# Patient Record
Sex: FEMALE
[De-identification: names, ages, dates, MRNs, and addresses within clinical notes are randomized per-mention and may not be internally consistent; named-entity substitution may affect disease eponyms.]

---

## 2019-07-30 ENCOUNTER — HOSPITAL ENCOUNTER (EMERGENCY)
Dept: HOSPITAL 25 - ED | Age: 84
Discharge: HOME | End: 2019-07-30
Payer: MEDICARE

## 2019-07-30 VITALS — DIASTOLIC BLOOD PRESSURE: 74 MMHG | SYSTOLIC BLOOD PRESSURE: 121 MMHG

## 2019-07-30 DIAGNOSIS — M25.461: ICD-10-CM

## 2019-07-30 DIAGNOSIS — M25.561: Primary | ICD-10-CM
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
## 2019-07-30 DIAGNOSIS — M79.661: ICD-10-CM
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
## 2019-07-30 PROCEDURE — 99282 EMERGENCY DEPT VISIT SF MDM: CPT

## 2019-07-30 NOTE — ED
Lower Extremity





- HPI Summary


HPI Summary: 





This pt is an 85 y/[facility] s/p injury 

today. Pt reports she was standing next to her bed and went to move her bed 

with her right leg. She notes she heard a loud pop and subsequently fell. 

Denies head strike. Denies right ankle pain, pelvic pain, left leg pain. Pt 

states she has not been able to ambulate since then. Her pain is aggravated 

with movement and weight bearing, which is rated 8/10 in severity and described 

as sharp. Her pain is alleviated with rest.  


Denies any prior injuries or surgeries to her right leg/right knee. 


Hx vertigo, GERD, hypercholesterolemia. 





- History of Current Complaint


Stated Complaint: RT LEG PAIN PER PT


Time Seen by Provider: 07/30/19 11:10


Hx Obtained From: Patient


Mechanism Of Injury: Blunt Trauma, Fall From A Standing Position


Onset of Pain: Hours


Onset/Duration: Still Present


Severity Currently: Severe


Pain Intensity: 8 - with movement


Pain Scale Used: 0-10 Numeric


Timing: Lasting Hours


Location: Is Discrete @ - right knee


Associated Signs And Symptoms: Positive: Knee Pain - right


Aggravating Factor(s): Movement


Alleviating Factor(s): Rest


Able to Bear Weight: No





- Allergies/Home Medications


Allergies/Adverse Reactions: 


 Allergies











Allergy/AdvReac Type Severity Reaction Status Date / Time


 


No Known Allergies Allergy   Verified 08/10/13 08:31














PMH/Surg Hx/FS Hx/Imm Hx


Endocrine/Hematology History: 


   Denies: Hx Diabetes, Hx Thyroid Disease


Cardiovascular History: Reports: Hx Hypercholesterolemia


   Denies: Hx Hypertension, Hx Pacemaker/ICD


Respiratory History: 


   Denies: Hx Asthma, Hx Chronic Obstructive Pulmonary Disease (COPD)


GI History: Reports: Hx Gastroesophageal Reflux Disease


   Denies: Hx Ulcer


 History: 


   Denies: Hx Dialysis, Hx Renal Disease


Musculoskeletal History: Reports: Hx Arthritis - right hand


   Denies: Hx Osteoporosis


Sensory History: Reports: Hx Cataracts, Hx Contacts or Glasses, Hx Glaucoma


   Denies: Hx Hearing Aid


Opthamlomology History: Reports: Hx Cataracts, Hx Contacts or Glasses, Hx 

Glaucoma


Neurological History: Reports: Hx Headaches - daily, Hx Seizures, Other Neuro 

Impairments/Disorders - Vertigo


   Denies: Hx Transient Ischemic Attacks (TIA)


Psychiatric History: Reports: Hx Panic Disorder - A LITTLE BIT





- Cancer History


Cancer Type, Location and Year: breast


Hx Chemotherapy: No


Hx Radiation Therapy: No





- Surgical History


Surgical History: Yes


Surgery Procedure, Year, and Place: LT LUMPECTOMY 2006, RT HAND SURGERY,  

HYSTERECTOMY 1972, CATARACTS,


Infectious Disease History: 


   Denies: Hx Clostridium Difficile, Hx Hepatitis, Hx Human Immunodeficiency 

Virus (HIV), Hx of Known/Suspected MRSA





- Family History


Known Family History: 


   Negative: Cardiac Disease, Hypertension, Diabetes





- Social History


Alcohol Use: Daily


Alcohol Amount: "1 DRINK" TODAY


Substance Use Type: Reports: None


Smoking Status (MU): Unknown if Ever Smoked





Review of Systems


Negative: Fever, Chills


Musculoskeletal: Other - POSITIVE: right lower leg/knee pain


Negative: Other - NEGATIVE: right ankle pain, left leg pain, left knee pain


All Other Systems Reviewed And Are Negative: Yes





Physical Exam





- Summary


Physical Exam Summary: 





Constitutional: Well-developed, Well-nourished, Alert. (-) Distressed


Skin: Warm, Dry


HENT: Normocephalic; Atraumatic


Eyes: Conjunctiva normal


Neck: Musculoskeletal ROM normal neck.


Cardio: Rhythm regular, rate normal, Heart sounds normal; Intact distal pulses; 

Radial pulses are 2+ and symmetric. (-) Murmur


Pulmonary/Chest wall: Effort normal. (-) Respiratory distress,


Abd: Soft, (-) tenderness, (-) Distension, (-) Guarding, (-) Rebound


Musculoskeletal: 


Pelvis: no soft tissue swelling or discoloration, no palpable widening of 

symphysis, stable to rocking


RLE: Mild knee swelling, no deformity effusions,, no crepitus palpated, 

compartments soft and compressible


SILT


2+ DP, brisk cap refill x 5





Hip: no ttp, no pain with log roll


Knee: lateral ttp, able to SLR, Pain w flextion, no lig laxity


Ankle: no ttp, FROM without pain, no instability


Toes: no ttp, FROM without pain


LLE: atraumatic





Lymph: (-) Cervical adenopathy


Neuro: Alert, Oriented x3


Psych: Mood and affect Normal


Triage Information Reviewed: Yes


Vital Signs On Initial Exam: 





 Initial Vitals











Temp Pulse Resp BP Pulse Ox


 


 97.7 F   72   18   144/96   96 


 


 07/30/19 11:10  07/30/19 11:10  07/30/19 11:10  07/30/19 11:10  07/30/19 11:10








Vital Signs Reviewed: Yes





Diagnostics





- Laboratory


Lab Statement: Any lab studies that have been ordered have been reviewed, and 

results considered in the medical decision making process.





- Radiology


  ** Right knee XR


Radiology Interpretation Completed By: Radiologist


Summary of Radiographic Findings: IMPRESSION: Small joint effusion, no fracture 

is seen. Dr. Ridley has reviewed this report.





  ** Right tibia fibula XR


Radiology Interpretation Completed By: Radiologist


Summary of Radiographic Findings: IMPRESSION: Osteopenia. No acute osseous 

injury. If symptoms persist, recommend repeat imaging. Dr. Ridley has 

reviewed this report.





Re-Evaluation





- Re-Evaluation


  ** First Eval


Re-Evaluation Time: 12:33


Comment: XR with no fractures, discussed with pt. Plan for knee immobilizer. 

She does not want to stay. She states she does not have stairs at home.  Plan 

for knee immobilizer and follow up with orthopedist.





  ** Second Eval


Re-Evaluation Time: 13:05


Comment: She ambulated with the walker. Plan to discharge patient now. She will 

take her medications at home.





Lower Extremity Course/Dx





- Course


Course Of Treatment: 86-year-old female who presents to the right knee and shin 

pain after mechanical fall.  - Tenderness just below the right knee, able to 

straight leg raise.  No obvious deformities check plain films and reassess.  - 

possible ligamentous injury although no obvious laxity on exam. Do not suspect 

dislocation/relocation





- Diagnoses


Provider Diagnoses: 


 Fall from ground level, Right leg pain








Discharge





- Sign-Out/Discharge


Documenting (check all that apply): Patient Departure - Discharge home


Patient Received Moderate/Deep Sedation with Procedure: No





- Discharge Plan


Condition: Stable


Disposition: HOME


Patient Education Materials:  Leg Pain (ED)


Referrals: 


Cecilia Lopez MD [Primary Care Provider] - 


Xi Hale MD [Medical Doctor] - 


Additional Instructions: 


You were seen in the emergency department for leg pain.  Your x-ray did not 

show any fractures however we cannot rule out injuries to the ligaments and 

tendons in your knee chest x-ray.  Please use a knee immobilizer.  Please 

follow up with orthopedics in the next week.


If any studies were not completed at the time of discharge you will be called 

with the relevant results.


Please follow up with your primary care doctor in next 2-3 days and return to 

emergency department for worsening pain, numbness or tingling of her leg, or 

concerning symptoms. It was a pleasure taking care of you today.





- Billing Disposition and Condition


Condition: STABLE


Disposition: Home





- Attestation Statements


Document Initiated by Zara: Yes


Documenting Scribe: Carmen Guerra


Provider For Whom Zara is Documenting (Include Credential): Viv Ridley MD


Scribe Attestation: 


I, Carmen Guerra, scribed for Viv Ridley MD on 07/30/19 at 1719. 


Scribe Documentation Reviewed: Yes


Provider Attestation: 


The documentation as recorded by the [email or web address] accurately reflects 

the service I personally performed and the decisions made by me, Viv Ridley MD


Status of Scribe Document: Viewed

## 2019-12-28 ENCOUNTER — HOSPITAL ENCOUNTER (EMERGENCY)
Dept: HOSPITAL 25 - ED | Age: 84
Discharge: HOME | End: 2019-12-28
Payer: MEDICARE

## 2019-12-28 VITALS — SYSTOLIC BLOOD PRESSURE: 174 MMHG | DIASTOLIC BLOOD PRESSURE: 68 MMHG

## 2019-12-28 DIAGNOSIS — M25.571: ICD-10-CM

## 2019-12-28 DIAGNOSIS — M51.37: ICD-10-CM

## 2019-12-28 DIAGNOSIS — M25.551: Primary | ICD-10-CM

## 2019-12-28 PROCEDURE — 72110 X-RAY EXAM L-2 SPINE 4/>VWS: CPT

## 2019-12-28 PROCEDURE — 99282 EMERGENCY DEPT VISIT SF MDM: CPT

## 2019-12-28 NOTE — XMS REPORT
Summary of Care

 Created on:2019



Patient:Loly Bob

Sex:Female

:1933

External Reference #:[id number]





Demographics







 Address  [address] [place] [address]

 

 Home Phone  [phone]

 

 Work Phone  [phone]

 

 Mobile Phone  [phone]

 

 Preferred Language  English

 

 Marital Status  Not  or 

 

 [employer] Affiliation  Unknown

 

 Race  White

 

 Ethnic Group  Not  or 









Author







 Organization  [facility]

 

 Address  [address] [address]









Support







 Name  Relationship  Address  Phone

 

 Grecia Morelos  Unavailable  [address]  [phone]



     [place] [address]  

 

 Grecia Morelos  Unavailable  [address]  [phone]



     [place] [address]  









Care Team Providers







 Name  Role  Phone

 

 Cecilia Lopez  Primary Care Provider  [phone]









Reason for Visit







 Reason  Comments

 

 Follow Up  5 week follow up on antidepressants - increase in dosage







Encounter Details







 Date  Type  Department  Care Team  Description

 

 2019  Office Visit  [facility]  Cecilia Lopez MD



  Depression,



     Medicine



  [address]



  unspecified depression



     [address]



  [place] [address]



  type (Primary Dx)



     [place] [address]



  [phone] (Fax)  







Allergies







 Active Allergy  Reactions  Severity  Noted Date  Comments

 

 Amitriptyline  Other    10/21/2014  Did not work for sleep or chronic



         pain

 

 Fenofibrate  Musculoskeletal    2013  Severe muscle cramps

 

 Alc-Gabapentin  CNS Reaction    2014  Dizzy



documented as of this encounter (statuses as of 2019)



Medications







 Medication  Sig  Dispensed  Refills  Start Date  End Date  Status

 

 Naproxen Sodium (ALEVE)  Take 1 Tab by    0      Active



 220 MG Oral Cap  mouth EVERY          



   BEDTIME.          

 

 brimonidine (ALPHAGAN  Place 1 Drop in    0  2016    Active



 P) 0.2 % Ophthalmic  both eyes TWICE          



 Solution  DAILY.          

 

 dorzolamide-timolol  Place 1 Drop in    0  2016    Active



 (COSOPT) 22.3-6.8 MG/ML  both eyes TWICE          



 Ophthalmic Solution  DAILY.          

 

 mupirocin (BACTROBAN) 2  Face-  Apply  30 g  3  2016    Active



 % Apply externally  twice daily          



 Cream            

 

 Omeprazole 40 MG Oral  Take 1 Cap by  90 Cap  3  2019    Active



 CAPSULE DELAYED RELEASE  mouth DAILY.          

 

 simvastatin (ZOCOR) 40  Take 1 Tab by  90 Tab  3  2019    Active



 MG Oral TabIndications:  mouth DAILY.          



 Lipid disorder            

 

 sertraline (ZOLOFT) 50  Take 1 Tab by  60 Tab  1  10/17/2019    Active



 MG Oral TabIndications:  mouth DAILY.          



 Depression, unspecified            



 depression type            









   



   Additional information



   Patient taking differently: 100 mg Oral DAILY, Reported on 2019  1:02 
PM









 sertraline (ZOLOFT) 100 MG  Take 1 Tab by mouth DAILY.  90 Tab  1  2019 
   Active



 Oral Tab            



documented as of this encounter (statuses as of 2019)



Active Problems







 Problem  Noted Date

 

 Cervical pain (neck)  2015

 

 Cervical spondylosis without myelopathy  2015

 

 Vertigo  2014









 Overview: 







 Has seen ear,  nose,  and throat / neurology in the past









 BMI 32.0-32.9,adult  2014









 Overview: 







 This patient's BMI has been calculated and is in the acceptable range   For 
this



 patient and age









 Chronic obstructive pulmonary disease  2009









 Overview: 







 Replaced inactive diagnosis









 Other and unspecified hyperlipidemia  2008

 

 Personal history of tobacco use, presenting hazards to health  10/29/2007

 

 Carcinoma in situ of breast  2006



documented as of this encounter (statuses as of 2019)



Immunizations







 Name  Administration Dates  Next Due

 

 Influenza Vaccine 65 Yrs +  2019  

 

 Influenza Vaccine High Dose  2017, 2016, 10/10/2015,  



   10/07/2014  

 

 Influenza Vaccine Whole  2008, 10/27/2007  

 

 PNEUMOCOCCAL POLYSACCHARIDE VACCINE  10/24/2006  

 

 Pneumococcal Conjugate Vaccine  2017  



documented as of this encounter



Social History







 Tobacco Use  Types  Packs/Day  Years Used  Date

 

 Former Smoker        Quit: 2000









 Smokeless Tobacco: Never Used      









 Comments: Quit 2000 after smoking 50 years.









 Alcohol Use  Drinks/Week  oz/Week  Comments

 

 Yes  1 Shots of liquor  1.0  









 Sex Assigned at Birth  Date Recorded

 

 Not on file  









 Job Start Date  Occupation  Industry

 

 Not on file  Not on file  Not on file









 Travel History  Travel Start  Travel End









 No recent travel history available.



documented as of this encounter



Last Filed Vital Signs







 Vital Sign  Reading  Time Taken  Comments

 

 Blood Pressure  124/58  2019 12:58 PM  



     EST  

 

 Pulse  75  2019 12:58 PM  



     EST  

 

 Temperature  -  -  

 

 Respiratory Rate  -  -  

 

 Oxygen Saturation  97%  2019 12:58 PM  



     EST  

 

 Inhaled Oxygen Concentration  -  -  

 

 Weight  67.5 kg (148 lb 14.4 oz)  2019 12:58 PM  



     EST  

 

 Height  154.9 cm (5' 1")  2019 12:58 PM  



     EST  

 

 Body Mass Index  28.13  2019 12:58 PM  



     EST  



documented in this encounter



Patient Instructions

Patient InstructionsCecilia Lopez MD - 2019  1:00 PM ESTContinue 
increased dose of sertraline ( 100mg )  Until we meetElectronically signed by 
Cecilia Lopez MD at 2019  1:33 PM EST

documented in this encounter



Progress Notes

Cecilia Lopez MD - 2019  1:00 PM ESTFormatting of this note might be 
different from the original.

NAME:Loly Bob

MRN: [id number]

1933: 1933

ENC Date: 2019



CC:

Chief Complaint

Patient presents with

 Follow Up

  5 week follow up on antidepressants - increase in dosage



Loly Bob is a 86-y.o. female

Follow up for depression -  Started on sertraline 50 mg last visit and now on 
100 mg x 1 week

10/17/19 - also counseled re non medicinal approaches

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



Current Outpatient Medications

Medication Sig

 brimonidine (ALPHAGAN P) 0.2 % Ophthalmic Solution Place 1 Drop in both 
eyes TWICE DAILY.

 dorzolamide-timolol (COSOPT) 22.3-6.8 MG/ML Ophthalmic Solution Place 1 
Drop in both eyes TWICE DAILY.

 mupirocin (BACTROBAN) 2 % Apply externally Cream Face-  Apply twice daily

 Naproxen Sodium (ALEVE) 220 MG Oral Cap Take 1 Tab by mouth EVERY 
BEDTIME.

 Omeprazole 40 MG Oral CAPSULE DELAYED RELEASE Take 1 Cap by mouth DAILY.

 sertraline (ZOLOFT) 100 MG Oral Tab Take 1 Tab by mouth DAILY.

 sertraline (ZOLOFT) 50 MG Oral Tab Take 1 Tab by mouth DAILY. (Patient 
taking differently: Take 100 mg by mouth DAILY.)

 simvastatin (ZOCOR) 40 MG Oral Tab Take 1 Tab by mouth DAILY.



No current facility-administered medications for this visit.



Patient Active Problem List

 Diagnosis Date Noted

 Vertigo 2014

  Priority: High

  Has seen ear,  nose,  and throat / neurology in the past



 Cervical pain (neck) 2015

 Cervical spondylosis without myelopathy 2015

 BMI 32.0-32.9,adult 2014

  This patient's BMI has been calculated and is in the acceptable range   For 
this patient and age



 Chronic obstructive pulmonary disease (HCC) 2009

  Replaced inactive diagnosis



 Other and unspecified hyperlipidemia 2008

 Personal history of tobacco use, presenting hazards to health 10/29/2007

 Carcinoma in situ of breast 2006



Family History

Problem Relation Age of Onset

 Arthritis Mother

 Heart Father

 No Known Problems Sister

 No Known Problems Daughter

 No Known Problems Son



No cardiopulmonary symptoms   No upper or lower GI complaints    No urinary 
tract symptoms.  No bruising/ bleeding. No neurological complaints .  No 
insomnia.+ .

Social History



Tobacco Use

 Smoking status: Former Smoker

  Last attempt to quit: 3/1/2000

  Years since quittin.7

 Smokeless tobacco: Never Used

 Tobacco comment: Quit 2000 after smoking 50 years.

Substance Use Topics

 Alcohol use: Yes

  Alcohol/week: 1.0 standard drinks

  Types: 1 Shots of liquor per week

 Drug use: No



OBJECTIVE:

/58  | Pulse 75  | Ht 5' 1" (1.549 m)  | Wt 148 lb 14.4 oz (67.5 kg)  | 
SpO2 97%  | BMI 28.13 kg/m .



A/P

  ICD-9-CM ICD-10-CM

1. Depression, unspecified depression type 311 F32.9



Patient Instructions

Continue increased dose of sertraline ( 100mg )  Until we meet



AUTHOR: Cecilia Lopez MD 13:33 2019Electronically signed by Cecilia Lopez MD at 2019  1:33 PM ESTdocumented in this encounter



Plan of Treatment







 Health Maintenance  Due Date  Last Done  Comments

 

 MEDICARE ANNUAL WELLNESS  2018, 04/15/2016  



 VISIT      

 

 DEPRESSION SCREENING  10/17/2020  10/17/2019, 10/17/2019  

 

 FALL RISK ASSESSMENT  2020, 2019  

 

 HIV SCREENING  2020    Postponed from



       1948 (Patient



       refused)

 

 ZOSTER IMMUNIZATION SERIES  2020    Postponed from



 (1 of 2)      1983 (Vaccine not



       available)

 

 LIPID DISORDER SCREENING  2024, 2012,  



     2010, Additional  



     history exists  

 

 PNEUMOCOCCAL 65+YRS  Completed  2017, 10/24/2006  

 

 INFLUENZA VACCINE  Completed  2019, 2017,  



     2016, Additional  



     history exists  

 

 HPV IMMUNIZATION SERIES  Aged Out    No longer eligible



       based on patient's age



       to complete this topic

 

 MENINGOCOCCAL VACCINE IMM  Aged Out    No longer eligible



       based on patient's age



       to complete this topic



documented as of this encounter



Goals







 Goal  Patient Goal  Associated  Recent  Patient-Stated?  Author



   Type  Problems  Progress    

 

 Depression  Depression    21  No  Jessica



 screen (PHQ-9)      (10/17/2019    MD Cecilia



 total score < 5      9:00 AM EDT)    









 Note: 



This is an individualized treatment (depression) goal for Lolyalessandra Bob:



 Displayed above is your goal for a depression screening (PHQ-9) score that 
would indicate good control of your depression.









 Keep immunizations current  Lifestyle      No  Cecilia Lopez MD









 Note: 



This is an individualized lifestyle goal for Lolymansi Bob:



 Please be sure to keep up-to-date on recommended immunizations.  For example, 
this would include a yearly influenza vaccine.



 Immunization status can be seen by looking at the Health Maintenance sections 
of your [facility], Plan of Care, and any After Visit Summaries.









 Keep a regular sleep schedule  Lifestyle      No  Cecilia Lopez MD









 Note: 



This is an individualized lifestyle goal for Lolymansi Bob:



 Please maintain a regular sleep schedule.  This may help with some symptoms of 
depression.









 Take all prescribed medications as directed  Self-management      No  Cecilia Lopez MD









 Note: 



This is an individualized self-management goal for Loly Bob:



 Please take all prescribed medications as directed.



 1.  Do not skip doses.  If you cannot afford your medications, talk with your 
doctor.



 2.  Use a pill reminder system such as a pill box if needed.  Your pharmacist 
can help you with this.



 3.  Contact your Pharmacy 5 days before your medication runs out.  If you 
cannot take your medications for any reasons, talk with your doctor.



 4.  Please bring all of your medication bottles and inhalers (or a list of all 
your medications/inhalers) with you to every visit.



 Potential barriers to meeting all of your care plan goals will continue to be 
addressed on an ongoing basis.



documented as of this encounter



Results

Not on filedocumented in this encounter



Visit Diagnoses







 Diagnosis

 

 Depression, unspecified depression type - Primary



documented in this encounter



Insurance







 Payer  Benefit Plan / Group  Subscriber ID  Effective Dates  Phone  Address  
Type

 

 MEDICARE  MEDICARE PART A & B  xxxxxxxxxxx  1993-Present      Medicare [employer] EMPIRE  [facility]-EMPIRE PLAN  xxxxxxxxx  2017-Present      [place]









 Guarantor Name  Account Type  Relation to  Date of  Phone  Billing Address



     Patient  Birth    

 

 Loly Bob  Personal/Famil    1933  [phone]  [address]      (Home)



  [place]







         [phone]  [place] [address]



         (Work)  



documented as of this encounter



Advance Directives







 Type  Date Recorded  Patient Representative  Explanation

 

 Advance Directives  9/15/2017 10:11 AM    [place] Living Will / Health



       Care Proxy

## 2019-12-28 NOTE — ED
Lower Extremity





- HPI Summary


HPI Summary: 





Patient is an 87 y/o F presenting to the ED via EMS for a chief complaint of 

right LE and right gluteal pain that began after a fall 2 weeks ago. Per EMS, 

patient is arriving from her house and does not take blood thinners. Patient 

reports seeing a chiropractor for her pain without relief. She states the right 

gluteal pain radiates to the right leg, right calf, and right toes. Patient 

denies right knee pain, back pain, or fever. She describes the pain as a 

constant throbbing sensation. She reports being unable to walk very well at 

home due to the pain she is experiencing. Patient denies any alleviating 

factors. Any significant PMHx or FMHx is denied. 





- History of Current Complaint


Stated Complaint: R LEG PAIN PER EMS


Hx Obtained From: Patient, EMS


Mechanism Of Injury: Fall From A Standing Position


Onset of Pain: Immediate


Onset/Duration: Weeks - 2 weeks


Severity Initially: Moderate


Severity Currently: Moderate


Pain Scale Used: 0-10 Numeric


Timing: Constant


Location: Is Discrete @ - Right LE and right glutteal region


Character Of Pain: Throbbing


Associated Signs And Symptoms: Negative: Knee Pain


Aggravating Factor(s): Ambulation


Alleviating Factor(s): Nothing





- Allergies/Home Medications


Allergies/Adverse Reactions: 


 Allergies











Allergy/AdvReac Type Severity Reaction Status Date / Time


 


No Known Allergies Allergy   Verified 08/05/19 12:34














PMH/Surg Hx/FS Hx/Imm Hx


Previously Healthy: Yes


Endocrine/Hematology History: 


   Denies: Hx Diabetes, Hx Thyroid Disease


Cardiovascular History: Reports: Hx Hypercholesterolemia


   Denies: Hx Hypertension, Hx Pacemaker/ICD


Respiratory History: 


   Denies: Hx Asthma, Hx Chronic Obstructive Pulmonary Disease (COPD)


GI History: Reports: Hx Gastroesophageal Reflux Disease


   Denies: Hx Ulcer


 History: 


   Denies: Hx Dialysis, Hx Renal Disease


Musculoskeletal History: Reports: Hx Arthritis - right hand


   Denies: Hx Osteoporosis


Sensory History: Reports: Hx Cataracts, Hx Contacts or Glasses, Hx Glaucoma


   Denies: Hx Legally Blind, Hx Deafness, Hx Hearing Aid


Opthamlomology History: Reports: Hx Cataracts, Hx Contacts or Glasses, Hx 

Glaucoma


   Denies: Hx Legally Blind


EENT History: 


   Denies: Hx Deafness


Neurological History: Reports: Hx Headaches - daily, Hx Seizures, Other Neuro 

Impairments/Disorders - Vertigo


   Denies: Hx Transient Ischemic Attacks (TIA)


Psychiatric History: 


   Denies: Hx Panic Disorder





- Cancer History


Cancer Type, Location and Year: breast


Hx Chemotherapy: No


Hx Radiation Therapy: No





- Surgical History


Surgical History: Yes


Surgery Procedure, Year, and Place: LT LUMPECTOMY 2006, RT HAND SURGERY,  

HYSTERECTOMY 1972, CATARACTS,


Infectious Disease History: No


Infectious Disease History: 


   Denies: Hx Clostridium Difficile, Hx Hepatitis, Hx Human Immunodeficiency 

Virus (HIV), Hx of Known/Suspected MRSA





- Family History


Known Family History: 


   Negative: Cardiac Disease, Hypertension, Diabetes





- Social History


Occupation: Retired


Lives: Alone


Alcohol Use: Daily


Alcohol Amount: "1 DRINK" TODAY


Substance Use Type: Reports: None


Hx Tobacco Use: No


Smoking Status (MU): Never Smoked Tobacco





Review of Systems


Negative: Fever


Positive: Myalgia - Right leg, right calf, right toes, and right gluteal 

region.  Negative: Arthralgia - Right knee or back


All Other Systems Reviewed And Are Negative: Yes





Physical Exam





- Summary


Physical Exam Summary: 





Constitutional: Well-developed, Well-nourished, Alert. (-) Distressed


Skin: Warm, Dry


HENT: Normocephalic; Atraumatic


Eyes: Conjunctiva normal


Neck: Musculoskeletal ROM normal neck. (-) JVD, (-) Stridor, (-) Nuchal rigidity


Cardio: Rhythm regular, rate normal, Heart sounds normal; Intact distal pulses; 

Radial pulses are 2+ and symmetric. (-) Murmur


Pulmonary/Chest wall: Effort normal. (-) Respiratory distress, (-) Wheezes, (-) 

Rales


Abd: Soft, (-) tenderness, (-) Distension, (-) Guarding, (-) Rebound


Musculoskeletal: (-) Edema. Tenderness of the right hip, buttocks, tib/fib, and 

foot, pain with decreased ROM of the right hip, paraspinal lumbar tenderness.


Lymph: (-) Cervical adenopathy


Neuro: Alert, Oriented x3


Psych: Mood and affect Normal


Triage Information Reviewed: Yes


Vital Signs Reviewed: Yes





Procedures





- Sedation


Patient Received Moderate/Deep Sedation with Procedure: No





Diagnostics





- Laboratory


Lab Statement: Any lab studies that have been ordered have been reviewed, and 

results considered in the medical decision making process.





- Radiology


  ** Lumbar Spine X-ray


Radiology Interpretation Completed By: Radiologist


Summary of Radiographic Findings: Lumbar Spine X-ray IMPRESSION:  1. NO 

EVIDENCE FOR FRACTURE.  2. MODERATE DEGENERATIVE DISC DISEASE.  Reviewed by Dr. Ridley.





  ** Lower Extremity X-ray


Radiology Interpretation Completed By: Radiologist


Summary of Radiographic Findings: Lower Extremity X-ray IMPRESSION: NO EVIDENCE 

OF FRACTURE.  Reviewed by Dr. Ridley.





  ** Hip/Pelvis X-ray


Radiology Interpretation Completed By: Radiologist


Summary of Radiographic Findings: Hip/Pelvis X-ray IMPRESSION: NO EVIDENCE FOR 

FRACTURE, IF THE PATIENT'S SYMPTOMS PERSIST RECOMMEND FOLLOW-UP IMAGING.  

Reviewed by Dr. Ridley.





  ** Ankle X-ray


Radiology Interpretation Completed By: Radiologist


Summary of Radiographic Findings: Ankle X-ray IMPRESSION: NO EVIDENCE FOR 

FRACTURE.  Reviewed by Dr. Ridley.





Re-Evaluation





- Re-Evaluation


  ** First Eval


Re-Evaluation Time: 09:49


Change: Improved


Comment: At 09:49, patient is able to ambulate with a walker.





Lower Extremity Course/Dx





- Course


Course Of Treatment: 87 y/o F p/w RLE pain in setting of fall 1 week ago.  - 

reporting buttock pain radiating down leg, also w tibfib ttp. Plain films 

without fracture. Has been ambulating at home w walker. Suspect piriformis pain

, low suspicion occult fracture. Will try motrin/tylenol and reassess





- Diagnoses


Provider Diagnoses: 


 Right hip pain








Discharge ED





- Sign-Out/Discharge


Documenting (check all that apply): Patient Departure - Discharge





- Discharge Plan


Condition: Stable


Disposition: HOME


Patient Education Materials:  Hip Pain (ED)


Referrals: 


Cecilia Lopez MD [Primary Care Provider] - 


Additional Instructions: 


You were seen in the emergency department for right leg pain.  Your x-rays did 

not show any fractures. You can take tylenol for the pain.


Please follow up with your primary care doctor in next 2-3 days and return to 

emergency department for inability to walk, numbness or tingling or weakness of 

the leg, worsening or concerning symptoms.


It was a pleasure taking care of you today.





- Billing Disposition and Condition


Condition: STABLE


Disposition: Home





- Attestation Statements


Document Initiated by Scribe: Yes


Documenting Scribe: Josie Hernandez


Provider For Whom Zara is Documenting (Include Credential): Viv Ridley MD


Scribe Attestation: 


I, Josie Hernandez, scribed for Viv Ridley MD on 12/28/19 at 0957. 


Scribe Documentation Reviewed: Yes


Provider Attestation: 


The documentation as recorded by the scribe, Josie Hernandez accurately reflects 

the service I personally performed and the decisions made by me, Viv Ridley MD


Status of Scribe Document: Viewed

## 2019-12-30 ENCOUNTER — HOSPITAL ENCOUNTER (INPATIENT)
Dept: HOSPITAL 25 - ED | Age: 84
LOS: 5 days | Discharge: HOSPICE | DRG: 917 | End: 2020-01-04
Attending: INTERNAL MEDICINE | Admitting: INTERNAL MEDICINE
Payer: MEDICARE

## 2019-12-30 DIAGNOSIS — E83.39: ICD-10-CM

## 2019-12-30 DIAGNOSIS — C78.1: ICD-10-CM

## 2019-12-30 DIAGNOSIS — E87.6: ICD-10-CM

## 2019-12-30 DIAGNOSIS — Z82.49: ICD-10-CM

## 2019-12-30 DIAGNOSIS — E87.4: ICD-10-CM

## 2019-12-30 DIAGNOSIS — D72.829: ICD-10-CM

## 2019-12-30 DIAGNOSIS — M51.36: ICD-10-CM

## 2019-12-30 DIAGNOSIS — Z85.3: ICD-10-CM

## 2019-12-30 DIAGNOSIS — Z66: ICD-10-CM

## 2019-12-30 DIAGNOSIS — C79.02: ICD-10-CM
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
## 2019-12-30 DIAGNOSIS — K21.9: ICD-10-CM

## 2019-12-30 DIAGNOSIS — Z87.891: ICD-10-CM

## 2019-12-30 DIAGNOSIS — C79.9: ICD-10-CM

## 2019-12-30 DIAGNOSIS — M47.896: ICD-10-CM

## 2019-12-30 DIAGNOSIS — T39.011A: Primary | ICD-10-CM

## 2019-12-30 DIAGNOSIS — Z79.899: ICD-10-CM

## 2019-12-30 DIAGNOSIS — G92: ICD-10-CM

## 2019-12-30 DIAGNOSIS — Z80.9: ICD-10-CM

## 2019-12-30 DIAGNOSIS — M85.88: ICD-10-CM

## 2019-12-30 DIAGNOSIS — Z51.5: ICD-10-CM

## 2019-12-30 DIAGNOSIS — C78.00: ICD-10-CM

## 2019-12-30 DIAGNOSIS — K80.20: ICD-10-CM

## 2019-12-30 DIAGNOSIS — C78.7: ICD-10-CM

## 2019-12-30 DIAGNOSIS — K44.9: ICD-10-CM

## 2019-12-30 LAB
ALBUMIN SERPL BCG-MCNC: 3.8 G/DL (ref 3.2–5.2)
ALBUMIN/GLOB SERPL: 1.2 {RATIO} (ref 1–3)
ALP SERPL-CCNC: 702 U/L (ref 34–104)
ALT SERPL W P-5'-P-CCNC: 16 U/L (ref 7–52)
ANION GAP SERPL CALC-SCNC: 23 MMOL/L (ref 2–11)
ANION GAP SERPL CALC-SCNC: 26 MMOL/L (ref 2–11)
AST SERPL-CCNC: 45 U/L (ref 13–39)
BASOPHILS # BLD AUTO: 0 10^3/UL (ref 0–0.2)
BUN SERPL-MCNC: 29 MG/DL (ref 6–24)
BUN SERPL-MCNC: 35 MG/DL (ref 6–24)
BUN/CREAT SERPL: 29.6 (ref 8–20)
BUN/CREAT SERPL: 32.1 (ref 8–20)
CALCIUM SERPL-MCNC: 8.1 MG/DL (ref 8.6–10.3)
CALCIUM SERPL-MCNC: 9.1 MG/DL (ref 8.6–10.3)
CHLORIDE SERPL-SCNC: 104 MMOL/L (ref 101–111)
CHLORIDE SERPL-SCNC: 107 MMOL/L (ref 101–111)
EOSINOPHIL # BLD AUTO: 0 10^3/UL (ref 0–0.6)
GLOBULIN SER CALC-MCNC: 3.3 G/DL (ref 2–4)
GLUCOSE SERPL-MCNC: 110 MG/DL (ref 70–100)
GLUCOSE SERPL-MCNC: 133 MG/DL (ref 70–100)
HCO3 SERPL-SCNC: 11 MMOL/L (ref 22–32)
HCO3 SERPL-SCNC: 13 MMOL/L (ref 22–32)
HCT VFR BLD AUTO: 36 % (ref 35–47)
HGB BLD-MCNC: 11.9 G/DL (ref 12–16)
LYMPHOCYTES # BLD AUTO: 0.5 10^3/UL (ref 1–4.8)
MCH RBC QN AUTO: 28 PG (ref 27–31)
MCHC RBC AUTO-ENTMCNC: 33 G/DL (ref 31–36)
MCV RBC AUTO: 86 FL (ref 80–97)
MONOCYTES # BLD AUTO: 1 10^3/UL (ref 0–0.8)
NEUTROPHILS # BLD AUTO: 16.9 10^3/UL (ref 1.5–7.7)
NRBC # BLD AUTO: 0 10^3/UL
NRBC BLD QL AUTO: 0
PLATELET # BLD AUTO: 417 10^3/UL (ref 150–450)
POTASSIUM SERPL-SCNC: 4.3 MMOL/L (ref 3.5–5)
POTASSIUM SERPL-SCNC: 4.8 MMOL/L (ref 3.5–5)
PROT SERPL-MCNC: 7.1 G/DL (ref 6.4–8.9)
RBC # BLD AUTO: 4.22 10^6 /UL (ref 3.7–4.87)
SALICYLATES SERPL-MCNC: 68.2 MG/DL (ref ?–30)
SODIUM SERPL-SCNC: 141 MMOL/L (ref 135–145)
SODIUM SERPL-SCNC: 143 MMOL/L (ref 135–145)
TROPONIN I SERPL-MCNC: 0.05 NG/ML (ref ?–0.03)
TROPONIN I SERPL-MCNC: 0.06 NG/ML (ref ?–0.03)
TROPONIN I SERPL-MCNC: 0.07 NG/ML (ref ?–0.03)
WBC # BLD AUTO: 18.5 10^3/UL (ref 3.5–10.8)
WBC UR QL AUTO: (no result)

## 2019-12-30 PROCEDURE — 74177 CT ABD & PELVIS W/CONTRAST: CPT

## 2019-12-30 PROCEDURE — 81003 URINALYSIS AUTO W/O SCOPE: CPT

## 2019-12-30 PROCEDURE — 83735 ASSAY OF MAGNESIUM: CPT

## 2019-12-30 PROCEDURE — 80048 BASIC METABOLIC PNL TOTAL CA: CPT

## 2019-12-30 PROCEDURE — 80320 DRUG SCREEN QUANTALCOHOLS: CPT

## 2019-12-30 PROCEDURE — 36415 COLL VENOUS BLD VENIPUNCTURE: CPT

## 2019-12-30 PROCEDURE — 99284 EMERGENCY DEPT VISIT MOD MDM: CPT

## 2019-12-30 PROCEDURE — 81015 MICROSCOPIC EXAM OF URINE: CPT

## 2019-12-30 PROCEDURE — 85025 COMPLETE CBC W/AUTO DIFF WBC: CPT

## 2019-12-30 PROCEDURE — 84484 ASSAY OF TROPONIN QUANT: CPT

## 2019-12-30 PROCEDURE — 93005 ELECTROCARDIOGRAM TRACING: CPT

## 2019-12-30 PROCEDURE — 72110 X-RAY EXAM L-2 SPINE 4/>VWS: CPT

## 2019-12-30 PROCEDURE — 70450 CT HEAD/BRAIN W/O DYE: CPT

## 2019-12-30 PROCEDURE — 72131 CT LUMBAR SPINE W/O DYE: CPT

## 2019-12-30 PROCEDURE — G0480 DRUG TEST DEF 1-7 CLASSES: HCPCS

## 2019-12-30 PROCEDURE — 80329 ANALGESICS NON-OPIOID 1 OR 2: CPT

## 2019-12-30 PROCEDURE — 82803 BLOOD GASES ANY COMBINATION: CPT

## 2019-12-30 PROCEDURE — 83605 ASSAY OF LACTIC ACID: CPT

## 2019-12-30 PROCEDURE — 84100 ASSAY OF PHOSPHORUS: CPT

## 2019-12-30 PROCEDURE — 87086 URINE CULTURE/COLONY COUNT: CPT

## 2019-12-30 PROCEDURE — 83880 ASSAY OF NATRIURETIC PEPTIDE: CPT

## 2019-12-30 PROCEDURE — 96374 THER/PROPH/DIAG INJ IV PUSH: CPT

## 2019-12-30 PROCEDURE — 99282 EMERGENCY DEPT VISIT SF MDM: CPT

## 2019-12-30 PROCEDURE — 80053 COMPREHEN METABOLIC PANEL: CPT

## 2019-12-30 PROCEDURE — 71260 CT THORAX DX C+: CPT

## 2019-12-30 PROCEDURE — 87040 BLOOD CULTURE FOR BACTERIA: CPT

## 2019-12-30 RX ADMIN — SODIUM BICARBONATE ONE ML: 84 INJECTION, SOLUTION INTRAVENOUS at 12:21

## 2019-12-30 RX ADMIN — SODIUM BICARBONATE ONE: 84 INJECTION, SOLUTION INTRAVENOUS at 12:03

## 2019-12-30 RX ADMIN — PANTOPRAZOLE SODIUM SCH MG: 40 INJECTION, POWDER, FOR SOLUTION INTRAVENOUS at 17:04

## 2019-12-30 NOTE — ED
Back Pain





- HPI Summary


HPI Summary: 





Patient is an 85 y/o F presenting to the ED via EMS for a chief complaint of 

back pain. Per EMS, patient fell two weeks ago at home after which she has had 

worsening back pain that radiates to the left LE. [place] [facility] 

[place]ed no fracture. Patient also has shortness of breath and fatigue. Patient 

denies any pain at the present time, but she states she has sciatica. She 

denies fever, urinary or bowel incontinence. No aggravating or alleviating 

factors are noted. EMS reports that the patient takes simvastatin and 

omeprazole daily, and has been taking aspirin for her back pain. On medical 

record review, PMHx is significant for breast cancer, GERD, and 

hypercholesterolemia. Allergies noted. Medications reviewed. 








- History of Current Complaint


Stated Complaint: BACK PAIN


Hx Obtained From: Patient, EMS, Medical Records


Onset/Duration: Sudden Onset, Lasting Days - 2 weeks ago, Still Present


Onset/Duration: Started Weeks Ago - 2 weeks ago, Traumatic, Still Present


Timing: Constant


Back Pain Location: Is Discrete @ - Back radiating to the left LE


Severity Initially: Severe


Severity Currently: Severe


Pain Scale Used: 0-10 Numeric


Aggravating Symptom(s): Nothing


Alleviating Symptom(s): Nothing


Associated Signs And Symptoms: Negative: Fever, Bladder Incontinence, Bowel 

Incontinence





- Allergies/Home Medications


Allergies/Adverse Reactions: 


 Allergies











Allergy/AdvReac Type Severity Reaction Status Date / Time


 


No Known Allergies Allergy   Verified 08/05/19 12:34











Home Medications: 


 Home Medications





Simvastatin (NF) [Zocor (NF)] 40 mg PO DAILY 12/30/19 [History Confirmed 12/30/ 19]











PMH/Surg Hx/FS Hx/Imm Hx


Previously Healthy: Yes


Endocrine/Hematology History: 


   Denies: Hx Diabetes, Hx Thyroid Disease


Cardiovascular History: Reports: Hx Hypercholesterolemia


   Denies: Hx Hypertension, Hx Pacemaker/ICD


Respiratory History: 


   Denies: Hx Asthma, Hx Chronic Obstructive Pulmonary Disease (COPD)


GI History: Reports: Hx Gastroesophageal Reflux Disease


   Denies: Hx Ulcer


 History: 


   Denies: Hx Dialysis, Hx Renal Disease


Musculoskeletal History: Reports: Hx Arthritis - right hand


   Denies: Hx Osteoporosis


Sensory History: Reports: Hx Cataracts, Hx Contacts or Glasses, Hx Glaucoma


   Denies: Hx Legally Blind, Hx Deafness, Hx Hearing Aid


Opthamlomology History: Reports: Hx Cataracts, Hx Contacts or Glasses, Hx 

Glaucoma


   Denies: Hx Legally Blind


EENT History: 


   Denies: Hx Deafness


Neurological History: Reports: Hx Headaches - daily, Hx Seizures, Other Neuro 

Impairments/Disorders - Vertigo


   Denies: Hx Transient Ischemic Attacks (TIA)


Psychiatric History: 


   Denies: Hx Panic Disorder





- Cancer History


Cancer Type, Location and Year: breast


Hx Chemotherapy: No


Hx Radiation Therapy: No





- Surgical History


Surgical History: Yes


Surgery Procedure, Year, and Place: LT LUMPECTOMY 2006, RT HAND SURGERY,  

HYSTERECTOMY 1972, CATARACTS,





- Immunization History


Date of Influenza Vaccine: 2019


Infectious Disease History: 


   Denies: Hx Clostridium Difficile, Hx Hepatitis, Hx Human Immunodeficiency 

Virus (HIV), Hx of Known/Suspected MRSA





- Family History


Known Family History: 


   Negative: Cardiac Disease, Hypertension, Diabetes





- Social History


Occupation: Retired


Alcohol Use: Daily


Alcohol Amount: "1 DRINK" TODAY


Hx Substance Use: No


Substance Use Type: Reports: None


Hx Tobacco Use: No


Smoking Status (MU): Never Smoked Tobacco





Review of Systems


Positive: Fatigue.  Negative: Fever


Positive: Shortness Of Breath


Negative: incontinence - Urinary or bowel


Positive: Myalgia - Back radiating to the left LE


All Other Systems Reviewed And Are Negative: Yes





Physical Exam





- Summary


Physical Exam Summary: 





Constitutional: Well-developed, Well-nourished, Alert. (-) Distressed


Skin: Warm, Dry


HENT: Normocephalic; Atraumatic


Eyes: Conjunctiva normal


Neck: Musculoskeletal ROM normal neck. (-) JVD, (-) Stridor, (-) Tracheal 

deviation


Cardio: Rhythm regular, rate normal, Heart sounds normal; Intact distal pulses; 

Radial pulses are 2+ and symmetric. (-) Murmur


Pulmonary/Chest wall: Effort normal. (-) Wheezes, (-) Rales. Tenderness to the 

anterior chest wall. Appears tachypneic. 


Abd: Soft, (-) tenderness, (-) Distension, (-) Guarding, (-) Rebound


Musculoskeletal: (-) Edema


Lymph: (-) Cervical adenopathy


Neuro: Alert, Oriented x3


Psych: Mood and affect Normal


Triage Information Reviewed: Yes


Vital Signs Reviewed: Yes





- [place] Coma Scale


Best Eye Response: 4 - Spontaneous


Best Motor Response: 6 - Obeys Commands


Best Verbal Response: 5 - Oriented


Coma Scale Total: 15





Procedures





- Sedation


Patient Received Moderate/Deep Sedation with Procedure: No





Diagnostics





- Laboratory


Result Diagrams: 


 12/30/19 09:29





 12/30/19 09:29


Lab Statement: Any lab studies that have been ordered have been reviewed, and 

results considered in the medical decision making process.





- Radiology


  ** Ribs w/ Chest X-ray


Radiology Interpretation Completed By: Radiologist


Summary of Radiographic Findings: Ribs w/Chest X-ray IMPRESSION:  OSTEOPENIA. 

NO DISPLACED RIB FRACTURE OR PNEUMOTHORAX.  Reviewed by Dr. Pimentel.





- CT


  ** Lumbar Spine CT


CT Interpretation Completed By: Radiologist


Summary of CT Findings: Lumbar Spine CT IMPRESSION:  1.  OSTEOPENIA.  2.  

DEGENERATIVE DISC DISEASE AND OSTEOARTHRITIS.  3.  THERE IS NO ACUTE OSSEOUS 

INJURY TO THE LUMBAR SPINE.  4.  THERE IS A SOFT TISSUE DENSITY WITHIN THE 

CENTRAL CANAL AT L3-L4 SUGGESTIVE OF SYNOVIAL CYST.  5.  THERE IS LEFT-SIDED 

DISC EXTRUSION AT L4-L5.  6.  THERE IS A CENTRAL DISC PROTRUSION AT T11-T12.  

7.  THERE ARE MULTIPLE SUBCUTANEOUS NODULES AND RETROPERITONEAL NODULES, AS 

WELL AS A LEFT ADRENAL NODULE. GIVEN THE HISTORY OF MALIGNANCY, THIS IS 

CONCERNING FOR METASTATIC DISEASE.  Reviewed by Dr. Pimentel.





  ** Brain CT


CT Interpretation Completed By: Radiologist


Summary of CT Findings: Brain CT IMPRESSION:  1.  NO ACUTE INTRACRANIAL 

PATHOLOGY.  2.  THERE IS NO SPACE-OCCUPYING LESION OR VASOGENIC EDEMA TO 

SUGGEST METASTATIC DISEASE TO THE BRAIN. IF THERE IS PERSISTENT CLINICAL 

CONCERN FOR NEOPLASM, CONTRAST-ENHANCED MRI MAY BE MORE SENSITIVE.  Reviewed by 

Dr. Pimentel.





  ** Chest/Abdomen/Pelvic CT


CT Interpretation Completed By: Radiologist


Summary of CT Findings: Chest/Abdomen/Pelvis CT IMPRESSION:  1. MULTIPLE 

PULMONARY NODULES MOST CONSISTENT WITH METASTATIC DISEASE.  2. ENLARGED 

MEDIASTINAL NODES.  3. MULTIPLE HEPATIC MASSES CONSISTENT WITH METASTATIC 

DISEASE.  4. LEFT ADRENAL MASS ALSO CONSISTENT WITH METASTATIC DISEASE.  5. 

LEFT RENAL SOLID MASSES LIKELY METASTATIC.  6. MULTIPLE ABDOMINAL AND PELVIC 

MASSES AS NOTED.  7. MULTIPLE SUBCUTANEOUS NODULES IN THE CHEST AND ABDOMINAL 

LEBRON.  8. LARGE HIATAL HERNIA.  9. CHOLELITHIASIS.  Reviewed by Dr. Pimentel.





- EKG


  ** 09:37


Cardiac Rate: NL - 98 BPM


EKG Rhythm: Sinus Rhythm


ST Segment: Normal


Ectopy: None


Summary of EKG Findings: EKG at 09:37 shows normal sinus rhythm with 98 BPM, no 

STEMI.  Reviewed and interpreted by Dr. Pimentel.





Re-Evaluation





- Re-Evaluation


  ** First Eval


Re-Evaluation Time: 09:42


Change: Worse


Comment: At 09:42, patient complains of pain "everywhere."





Back Pain Course/Dx





- Diagnoses


Provider Diagnoses: 


 Metastatic cancer, High anion gap metabolic acidosis, Respiratory alkalosis, 

Aspirin overdose








- Provider Notifications


Discussed Care Of Patient With: Mary Sanches - At 13:54, patients case was 

reviewed by Dr. Sanches who agrees [place] the [place] [facility] [place] diagnosis 

of metastatic cancer, aspirin overdose, anion gap metabolic acidosis, and 

respiratory alkalosis.


Time Discussed With Above Provider: 13:54


Instructed by Provider To: Admit As Inpatient





- Critical Care Time


Critical Care Time: 30-74 min - 35 minutes





Discharge ED





- Sign-Out/Discharge


Documenting (check all that apply): Patient Departure - Admit





- Discharge Plan


Condition: Stable


Disposition: ADMITTED TO [facility]


Referrals: 


Cecilia Lopez MD [Primary Care Provider] - 





- Attestation Statements


Document Initiated by Scribe: Yes


Documenting Scribe: Josie Hernandez


Provider For Whom Scribe is Documenting (Include Credential): Mustapha Pimentel MD


Scribe Attestation: 


Josie ALMODOVAR, scribed for Mustapha Pimentel MD on 12/30/19 at 2748. 


Status of Scribe Document: Ready

## 2019-12-30 NOTE — PN
Hospitalist Progress Note


Date of Service: 12/30/19





Discussed with sister (HCP) and nephew at bedside. Discussed regarding the 

aspirin overdose and the metastatic cancer.


GOals of care discussed as well. Will be DNR, DNI. 


TO have family meeting regarding further goals of care tomorrow- with sister, 

brother and nephew.

## 2019-12-30 NOTE — HP
CC:  Dr. Cecilia Lopez*

 

HISTORY AND PHYSICAL:

 

DATE OF ADMISSION:  12/30/19

 

PRIMARY CARE PROVIDER:  Dr. Cecilia Lopez.

 

REASON FOR THE ADMISSION:  Back pain.

 

HISTORY OF PRESENT ILLNESS:  This is an 86-year-old female with past medical 
history of breast cancer, in remission, who comes to the emergency room because 
of back pain.  The patient reports that she has been taking aspirin on and off 
because of constant pain.  The patient states that the back pain is getting 
worse and it radiates to her left leg.  She also reports that she is having 
some shortness of breath and chronic fatigue.  She does not have any urinary or 
bowel incontinence.

 

The patient currently is very confused and cannot give any further history.  
The patient reported to the emergency room and the EMS found that there was an 
empty aspirin bottle next to her.  Aspirin level here was done and is 68.20.  
The patient in the emergency room was seen and evaluated, CAT scans were 
obtained, lab work was also obtained, and the patient was given sodium bicarb 1 
amp push and was started on IV fluids containing bicarbonate.  Subsequently, 
the hospitalist service was called.

 

PAST MEDICAL HISTORY:  Includes hyperlipidemia; GERD; breast cancer, status 
post lumpectomy; migraines.

 

PAST SURGICAL HISTORY:  At this point is unknown.

 

MEDICATIONS:  Home medications from what we can gather based on our computer 
system are:

1.  Simvastatin 40 mg daily.

2.  Omeprazole 40 mg daily.

 

ALLERGIES:  No known drug allergies.

 

FAMILY HISTORY:  At this point, we cannot obtain this from the patient.  
According to the sister, family history does include malignancies as well as 
heart disease.

 

SOCIAL HISTORY:  The patient lives alone; has smoking history, with 50-pack 
year history; used to drink alcohol.  If she currently drinks alcohol, we are 
unsure. Healthcare proxy is her sister, her name is Grecia Morelos, phone 
number is [phone].  It seems that the sister thinks that the patient 
would not want to be resuscitated; however, she is coming into the hospital to 
discuss goals of care with us and sign the MOLST form.

 

REVIEW OF SYSTEMS:  Cannot be obtained from the patient as the patient cannot 
give any further history.  Review of systems is limited.

 

                               PHYSICAL EXAMINATION

 

GENERAL:  This is an elderly female, well developed, lying in bed, in no acute 
distress.

 

VITAL SIGNS:  Blood pressure 120/61, heart rate of 108, respiratory rate of 22, 
oxygen saturation of 98% on 2 L nasal cannula, temperature of 98.7 Fahrenheit.

 

HEENT:  Pupils are equal, round, and reactive to light.  Atraumatic, 
normocephalic.

 

LUNGS:  There is no tachypnea.  Lungs are clear without any wheezing, rales, or 
rhonchi.

 

HEART:  There is no chest wall tenderness, regular, tachycardia.  No murmurs.

 

ABDOMEN:  Normoactive bowel sounds.  Abdomen is soft, nontender, nondistended. 
There is no costovertebral tenderness.  There is back tenderness.

 

NEUROLOGIC:  She is following commands; however, she is awake, alert, oriented 
x1 to person.  There is no focal deficit as I can gather.

 

 DIAGNOSTIC STUDIES/LAB DATA:  White count of 18.5 with absolute neutrophil 
count of 16.9, hemoglobin of 11.9, RDW of 17, platelets of 417.  VBG shows pH 
of 7.34, pCO2 of 20, pO2 of 51.  Sodium 141, potassium 4.8, chloride 104, 
bicarb 11, anion gap 26, BUN of 29, creatinine of 0.98, alkaline phosphatase of 
702, glucose of 110, lactic acid of 2.3, AST of 45, ALT of 16.  Urinalysis 
shows 1+ protein, 2+ ketones. Salicylate level of 68.2, serum alcohol less than 
10.

 

Images show CT of the chest, abdomen, and pelvis was done, which reveals 
multiple pulmonary nodules most consistent with metastatic disease, enlarged 
mediastinal nodes, multiple hepatic masses consistent with metastatic disease, 
left adrenal mass also consistent with metastatic disease, left renal solid 
mass likely metastatic, multiple abdominopelvic masses noted, multiple 
subcutaneous nodules in the chest and abdominal walls, large hiatal hernia, and 
cholelithiasis.

 

The patient also underwent a brain CT, which revealed no acute intracranial 
pathology.  There is no space-occupying lesion or vasogenic edema to suggest 
metastatic disease to the brain.

 

She also had a rib and a chest x-ray, which reveals osteopenia with no 
displaced rib fracture or pneumothorax.

 

Also had a lumbar CT, which shows osteopenia with degenerative disk disease and 
osteoarthritis.  There is no acute osseous injury to the lumbar spine.  There 
is a soft tissue density with a central canal stenosis suggestive of synovial 
cyst. There is a left-sided disk extrusion at the L4-L5.  There is a central 
disk protrusion at T11-T12.  There are multiple subcutaneous nodules and 
retroperitoneal nodule as well as left adrenal nodule.  Given the history of 
malignancy, there is concern for metastatic disease.

 

IMPRESSION AND PLAN:

1.  Aspirin overdose.  Suggestive with salicylate level of 68.20.  The patient 
has a high anion gap metabolic acidosis with respiratory alkalosis as well as 
metabolic alkalosis.  The patient has been given bicarb in the emergency room.  
I will get a repeat basic metabolic panel to assess her bicarbonate level and 
electrolytes as well.  We will start the patient on normal saline, repeat blood 
work in the morning.

2.  Multiple lesions seen on CAT scans suggestive of metastatic disease.  The 
patient does have a history of breast cancer, supposedly is in remission.  This 
could be recurrence of malignant breast cancer versus a new cancer altogether.  
I will discuss with the patient's healthcare proxy regarding goals of care and 
go forward with management as dictated by the healthcare proxy.

3.  Elevated troponin.  Likely demand related.  The patient has troponin of 
0.05. Of note, the patient took several aspirin, so I will not treat this with 
aspirin at this point.

4.  Back pain.  I ordered morphine p.r.n. for pain control.  Bedrest for now.

5.  The patient is having encephalopathy due to aspirin overdose.  We will 
treat with IV fluids, monitor, and treat conservatively at this point.

6.  History of gastroesophageal reflux disease.  Pantoprazole IV.  I would 
avoid any more aspirin or NSAIDs at this point.

7.  For DVT prophylaxis, I have started the patient on sequential compression 
device.  Due to aspirin overdose, I do not want to give her any pharmacological 
agents for DVT prophylaxis as to create a high risk for bleeding.

8.  There is leukocytosis; however, I do not have a source of infection.  I do 
not see any rashes or lesions.  Urinalysis was done, which did not show 
evidence of infection.  I will order blood cultures to see for possible 
infection; however, in her case, I would suspect that the patient has 
leukocytosis due to underlying malignancy.

9.  Goals of care:  At this point, we are awaiting the arrival of her sister so 
I can discuss advance care planning with her.

 

 

 

[id number]/CPS #: [id number]

DARREN

## 2019-12-31 LAB
ALBUMIN SERPL BCG-MCNC: 3.2 G/DL (ref 3.2–5.2)
ALBUMIN/GLOB SERPL: 1.4 {RATIO} (ref 1–3)
ALP SERPL-CCNC: 481 U/L (ref 34–104)
ALT SERPL W P-5'-P-CCNC: 17 U/L (ref 7–52)
ANION GAP SERPL CALC-SCNC: 16 MMOL/L (ref 2–11)
AST SERPL-CCNC: 56 U/L (ref 13–39)
BASOPHILS # BLD AUTO: 0 10^3/UL (ref 0–0.2)
BUN SERPL-MCNC: 47 MG/DL (ref 6–24)
BUN/CREAT SERPL: 38.5 (ref 8–20)
CALCIUM SERPL-MCNC: 7.3 MG/DL (ref 8.6–10.3)
CHLORIDE SERPL-SCNC: 104 MMOL/L (ref 101–111)
EOSINOPHIL # BLD AUTO: 0 10^3/UL (ref 0–0.6)
GLOBULIN SER CALC-MCNC: 2.3 G/DL (ref 2–4)
GLUCOSE SERPL-MCNC: 175 MG/DL (ref 70–100)
HCO3 SERPL-SCNC: 22 MMOL/L (ref 22–32)
HCT VFR BLD AUTO: 28 % (ref 35–47)
HGB BLD-MCNC: 9.2 G/DL (ref 12–16)
LYMPHOCYTES # BLD AUTO: 0.7 10^3/UL (ref 1–4.8)
MCH RBC QN AUTO: 28 PG (ref 27–31)
MCHC RBC AUTO-ENTMCNC: 33 G/DL (ref 31–36)
MCV RBC AUTO: 85 FL (ref 80–97)
MONOCYTES # BLD AUTO: 1.2 10^3/UL (ref 0–0.8)
NEUTROPHILS # BLD AUTO: 11.5 10^3/UL (ref 1.5–7.7)
NRBC # BLD AUTO: 0 10^3/UL
NRBC BLD QL AUTO: 0.1
PLATELET # BLD AUTO: 265 10^3/UL (ref 150–450)
POTASSIUM SERPL-SCNC: 2.9 MMOL/L (ref 3.5–5)
PROT SERPL-MCNC: 5.5 G/DL (ref 6.4–8.9)
RBC # BLD AUTO: 3.28 10^6 /UL (ref 3.7–4.87)
SODIUM SERPL-SCNC: 142 MMOL/L (ref 135–145)
TROPONIN I SERPL-MCNC: 0.09 NG/ML (ref ?–0.03)
WBC # BLD AUTO: 13.4 10^3/UL (ref 3.5–10.8)

## 2019-12-31 RX ADMIN — SODIUM CHLORIDE AND POTASSIUM CHLORIDE SCH MLS/HR: 9; 2.98 INJECTION, SOLUTION INTRAVENOUS at 11:37

## 2019-12-31 RX ADMIN — PANTOPRAZOLE SODIUM SCH MG: 40 INJECTION, POWDER, FOR SOLUTION INTRAVENOUS at 09:38

## 2019-12-31 NOTE — CONSULT
Palliative / Hospice Consult


Ordering Provider: Monserrat Bearden - PCP-Jessica


Referal Reason: 


Goals of care/no bowel meds/MS





- Subjective


Code Status: DNR


Advance Directives Location: In Chart


MOLST Part A Completed: Yes - on chart


MOLST Part E Completed:: Yes - on chart





- History or Present Illness


History or Present Illness: 


87yo female with h/o of breast cancer presents to ER with back pain and L leg 

pain for which she has been taking aspirin. Also complains of SOB and chronic 

fatigue. PMH is significant for breast cancer s/p lumpectomy in remission, 

hyperlipidemia, GERD and migraine. PSHx pt lives on her own she was  

from her 1st (Kyle) remarried and is now  from [facility], she has 2 

children a son and daughter who she doesn't talk to much, retired from [employer] dept, ex tob user, ex etoh user, sister Grecia is her HCP(on chart). 

Studies ekg-nsr, brain CT-neg, rib/chest xray-osteopenia, L/S spine-osteopenia, 

osteoarthritis, DJD, synovial cyst L3/ L4, L sided disc protrusion L4/L5, 

central disc protrusion T11/T12, multiple nodules subcutaneous, retroperitoneal 

and adrenal, chest/abd/pel CT-multiple pulmonary nodules, enlarged mediastinal 

nodes, hepatic masees, L adrenal and renal masses, abd and pelvic masses hiatal 

hernia, cholelithiasis, subcutaneous nodules in chest and abdominal wall, H/H 

9.2/28, BUN/Cr 47/1.22, egr 41.8, Ca 7.3, troponin .09, alb 3.2, UC neg and ASA 

level 68.20. Pt admitted with aspirin overdose, metastatic nodules and back 

pain. Pt has 2 prior ER visits for R leg pain in the last year. All history is 

from the pt, family and medical record.


Lab Values: 


 Abnormal Lab Results











  19





  15:01 15:31 21:07


 


WBC   


 


RBC   


 


Hgb   


 


Hct   


 


MCV   


 


MCH   


 


MCHC   


 


RDW   


 


Plt Count   


 


MPV   


 


Neut % (Auto)   


 


Lymph % (Auto)   


 


Mono % (Auto)   


 


Eos % (Auto)   


 


Baso % (Auto)   


 


Absolute Neuts (auto)   


 


Absolute Lymphs (auto)   


 


Absolute Monos (auto)   


 


Absolute Eos (auto)   


 


Absolute Basos (auto)   


 


Absolute Nucleated RBC   


 


Nucleated RBC %   


 


Sodium  143  


 


Potassium  4.3  


 


Chloride  107  


 


Carbon Dioxide  13 L*  


 


Anion Gap  23 H  


 


BUN  35 H  


 


Creatinine  1.09 H  


 


Est GFR ( Amer)  57.6  


 


Est GFR (Non-Af Amer)  47.6  


 


BUN/Creatinine Ratio  32.1 H  


 


Glucose  133 H  


 


Lactic Acid   1.8 


 


Calcium  8.1 L  


 


Total Bilirubin   


 


AST   


 


ALT   


 


Alkaline Phosphatase   


 


Troponin I  0.06 H*   0.07 H*


 


Total Protein   


 


Albumin   


 


Globulin   


 


Albumin/Globulin Ratio   














  19





  05:49 05:49


 


WBC  13.4 H 


 


RBC  3.28 L 


 


Hgb  9.2 L 


 


Hct  28 L 


 


MCV  85 


 


MCH  28 


 


MCHC  33 


 


RDW  16 H 


 


Plt Count  265 


 


MPV  7.6 


 


Neut % (Auto)  86.1 


 


Lymph % (Auto)  5.0 


 


Mono % (Auto)  8.7 


 


Eos % (Auto)  0.0 


 


Baso % (Auto)  0.2 


 


Absolute Neuts (auto)  11.5 H 


 


Absolute Lymphs (auto)  0.7 L 


 


Absolute Monos (auto)  1.2 H 


 


Absolute Eos (auto)  0.0 


 


Absolute Basos (auto)  0.0 


 


Absolute Nucleated RBC  0.0 


 


Nucleated RBC %  0.1 


 


Sodium   142


 


Potassium   2.9 L


 


Chloride   104


 


Carbon Dioxide   22


 


Anion Gap   16 H


 


BUN   47 H


 


Creatinine   1.22 H


 


Est GFR ( Amer)   50.6


 


Est GFR (Non-Af Amer)   41.8


 


BUN/Creatinine Ratio   38.5 H


 


Glucose   175 H


 


Lactic Acid  


 


Calcium   7.3 L


 


Total Bilirubin   0.30


 


AST   56 H


 


ALT   17


 


Alkaline Phosphatase   481 H


 


Troponin I   0.09 H*


 


Total Protein   5.5 L


 


Albumin   3.2


 


Globulin   2.3


 


Albumin/Globulin Ratio   1.4








 Laboratory Last Values











WBC  13.4 10^3/uL (3.5-10.8)  H  19  05:49    


 


RBC  3.28 10^6 /uL (3.70-4.87)  L  19  05:49    


 


Hgb  9.2 g/dL (12.0-16.0)  L  19  05:49    


 


Hct  28 % (35-47)  L  19  05:49    


 


MCV  85 fL (80-97)   19  05:49    


 


MCH  28 pg (27-31)   19  05:49    


 


MCHC  33 g/dL (31-36)   19  05:49    


 


RDW  16 % (10-15)  H  19  05:49    


 


Plt Count  265 10^3/uL (150-450)   19  05:49    


 


MPV  7.6 fL (7.4-10.4)   19  05:49    


 


Neut % (Auto)  86.1 %  19  05:49    


 


Lymph % (Auto)  5.0 %  19  05:49    


 


Mono % (Auto)  8.7 %  19  05:49    


 


Eos % (Auto)  0.0 %  19  05:49    


 


Baso % (Auto)  0.2 %  19  05:49    


 


Absolute Neuts (auto)  11.5 10^3/ul (1.5-7.7)  H  19  05:49    


 


Absolute Lymphs (auto)  0.7 10^3/ul (1.0-4.8)  L  19  05:49    


 


Absolute Monos (auto)  1.2 10^3/ul (0-0.8)  H  19  05:49    


 


Absolute Eos (auto)  0.0 10^3/ul (0-0.6)   19  05:49    


 


Absolute Basos (auto)  0.0 10^3/ul (0-0.2)   19  05:49    


 


Absolute Nucleated RBC  0.0 10^3/ul  19  05:49    


 


Nucleated RBC %  0.1   19  05:49    


 


VBG pH  7.34  (7.32-7.43)   19  10:41    


 


VBG pCO2  20 mmHg (41-51)  L  19  10:41    


 


VBG pO2  51.0 mmHg (35-45)  H  19  10:41    


 


VBG HCO3  14.6 mmol/L (24-28)  L  19  10:41    


 


VBG O2 Saturation  84.2 % (70-80)  H  19  10:41    


 


VBG Base Excess  -12.7 mmol/L (0.0-4.0)  L  19  10:41    


 


Sodium  142 mmol/L (135-145)   19  05:49    


 


Potassium  2.9 mmol/L (3.5-5.0)  L  19  05:49    


 


Chloride  104 mmol/L (101-111)   19  05:49    


 


Carbon Dioxide  22 mmol/L (22-32)   19  05:49    


 


Anion Gap  16 mmol/L (2-11)  H  19  05:49    


 


BUN  47 mg/dL (6-24)  H  19  05:49    


 


Creatinine  1.22 mg/dL (0.51-0.95)  H  19  05:49    


 


Est GFR ( Amer)  50.6  (>60)   19  05:49    


 


Est GFR (Non-Af Amer)  41.8  (>60)   19  05:49    


 


BUN/Creatinine Ratio  38.5  (8-20)  H  19  05:49    


 


Glucose  175 mg/dL ()  H  19  05:49    


 


Lactic Acid  1.8 mmol/L (0.5-2.0)   19  15:31    


 


Calcium  7.3 mg/dL (8.6-10.3)  L  19  05:49    


 


Total Bilirubin  0.30 mg/dL (0.2-1.0)   19  05:49    


 


AST  56 U/L (13-39)  H  19  05:49    


 


ALT  17 U/L (7-52)   19  05:49    


 


Alkaline Phosphatase  481 U/L ()  H  19  05:49    


 


Troponin I  0.09 ng/mL (<0.03)  H*  19  05:49    


 


B-Natriuretic Peptide  274 pg/mL (<=100)  H  19  09:29    


 


Total Protein  5.5 g/dL (6.4-8.9)  L  19  05:49    


 


Albumin  3.2 g/dL (3.2-5.2)   19  05:49    


 


Globulin  2.3 g/dL (2-4)   19  05:49    


 


Albumin/Globulin Ratio  1.4  (1-3)   19  05:49    


 


Urine Color  Yellow   19  13:10    


 


Urine Appearance  Clear   19  13:10    


 


Urine pH  5.0  (5-9)   19  13:10    


 


Ur Specific Gravity  1.019  (1.010-1.030)   19  13:10    


 


Urine Protein  1+(30 mg/dl)  (Negative)  A  19  13:10    


 


Urine Ketones  2+  (Negative)  A  19  13:10    


 


Urine Blood  Negative  (Negative)   19  13:10    


 


Urine Nitrate  Negative  (Negative)   19  13:10    


 


Urine Bilirubin  Negative  (Negative)   19  13:10    


 


Urine Urobilinogen  Negative  (Negative)   19  13:10    


 


Ur Leukocyte Esterase  Negative  (Negative)   19  13:10    


 


Urine WBC (Auto)  Trace(0-5/hpf)  (Absent)   19  13:10    


 


Urine RBC (Auto)  Absent  (Absent)   19  13:10    


 


Ur Squamous Epith Cells  Present  (Absent)  A  19  13:10    


 


Urine Bacteria  Absent  (Absent)   19  13:10    


 


Urine Glucose  Negative  (Negative)   19  13:10    


 


Salicylates  68.20 mg/dL (<30)  H*  19  09:29    


 


Serum Alcohol  < 10 mg/dL (<10)   19  09:29    














- Objective


Active Medications: 








Potassium Chloride/Sodium Chloride (Ns 0.9% W/ 40 Meq Kcl 1000 Ml*)  1,000 mls 

@ 75 mls/hr IV PER RATE [facility]


   Last Admin: 19 11:37 Dose:  75 mls/hr


Morphine Sulfate (Morphine Inj (Syringe))*)  1 mg IV Q4H PRN


   PRN Reason: PAIN - SEVERE


Ondansetron HCl (Zofran Inj*)  4 mg IV Q6H PRN


   PRN Reason: NAUSEA


Pantoprazole Sodium (Protonix Iv*)  40 mg IV DAILY [facility]


   Last Admin: 19 09:38 Dose:  40 mg








Vital Signs: 


Vital Signs:











Temp Pulse Resp BP Pulse Ox


 


 98.9 F   92   20   118/50   96 


 


 19 07:09  19 07:09  19 08:00  19 07:09  19 07:09











Patient Weight: 


 





Weight                           65.771 kg








Intake and Output: 


 Intake & Output











 19





 06:59 06:59 06:59 06:59


 


Intake Total   2662 1130


 


Output Total   300 


 


Balance   2362 1130


 


Weight   65.771 kg 


 


Intake:    


 


  IV Fluids   2662 


 


    sodium bicarb 8.4 50 meq   661 





    in D5W    


 


    sodium bicarb 8.4% 150   1001 





    meq in D5W    


 


  Oral   0 1130


 


Output:    


 


  Straight Cath   300 


 


Other:    


 


  Estimated Void    Medium


 


  # Voids   0 3





 





ADLs: Meal  Record                                         Start:  19 15:

34


Freq:   DAILY@0900,1400,1800                               Status: Active      

  


Protocol:                                                                      

  


 Created      19 15:34  System  (Rec: 19 15:34  System  MED-C16)


 Document     19 08:47  [id number]  (Rec: 19 08:47  [id number]  MED-C09)


 Document     19 09:00  [id number]  (Rec: 19 11:28  [id number]  MED-C11)


 Document     19 12:43  [id number]  (Rec: 19 12:44  [id number]  MED-C09)


 Document     19 14:00  [id number]  (Rec: 19 14:33  [id number]  MED-C09)


Intake and Output                                          Start:  19 09:

33


Freq:                                                      Status: Active      

  


Protocol:                                                                      

  


 Created      19 09:33  System  (Rec: 19 09:33  System  ED-C31)


Intake and Output                                          Start:  19 15:

34


Freq:   DAILY@0600,1400,2200                               Status: Active      

  


Protocol:                                                                      

  


 Created      19 15:34  System  (Rec: 19 15:34  System  MED-C16)


 Document     19 23:09  [id number]  (Rec: 19 23:09  [id number]  MED-C25)


 Document     19 05:37  [id number]  (Rec: 19 05:39  [id number]  MED-C14)


 Document     19 14:00  [id number]  (Rec: 19 14:33  [id number]  MED-C09)








Head: Normal


Eyes: No Scleral Icterus


Ears/Nose/Mouth/Throat: NL Teeth, Lips, Gums, - - dry oral mucosa, no nystagmus


Neck: NL Appearance and Movements; NL JVP, Trachea Midline


Cardiovascular: RRR, No Edema, - - 3/6 systolic murmur at the RUSB


Respiratory: Symmetrical Chest Expansion and Respiratory Effort


Abdominal: NL Sounds; No Tenderness; No Distention


Extremities: No Edema


Neurological: - - follows some commands, oriented X 2, pleasantly confused.





- Assessment


Assessment: 


87yo female admitted with multiple masses felt to be metastatic disease





- Plan


Consult Plan (MU): Hospice


Plan: 


Long discussion with pt in hospital and spoke with nephew Nj over the phone 

about goals of care. Pt was told she has multiple masses which are presumed to 

be cancerous. Pt doesn't want a biopsy or any treatment. Pt would prefer to die 

at the hospital. She is agreeable to [facility], her   at 

the residence which went well. If there is no bed, family suggested [facility] with hospice, her mother had been at [facility]. Hospice information/

brochure given.  aware and will send referral to [facility]. Spoke 

with pt about reaching out to her son and daughter about her hospitalization/

diagnosis but pt declined saying they have not been in touch. Pt stated she wasn

't in pain or discomfort just tired. akua add bowel meds. Pt is eligible for 

hospice with a diagnosis of presumed metastatic cancer. KPS 40%, PPS 40%  





- Time On Unit


Date of Evaluation: 19


Hospice Consult Time in: 14:00


Hospice Consult Time Out: 15:00


Hospice Consult Time Total: 60


> 50% of Time Spend In Counseling or Coordinating Care: Yes

## 2019-12-31 NOTE — PN
Hospitalist Progress Note


Date of Service: 12/31/19





Called for unable to void


Bladder scan 400ml





Plan:


Straight cath stat


bladder scan at 2am

## 2019-12-31 NOTE — PN
Subjective


Date of Service: 12/31/19


Interval History: 





She is more alert and interactive today, but remains confused.


Family History: Unchanged from Admission


Social History: Unchanged from Admission


Past Medical History: Unchanged from Admission





Objective


Active Medications: 








Sodium Bicarbonate 50 meq/ (Dextrose)  1,050 mls @ 100 mls/hr IV ONCE ONE


   Stop: 12/31/19 10:59


   Last Admin: 12/31/19 00:11 Dose:  100 mls/hr


Potassium Chloride/Sodium Chloride (Ns 0.9% W/ 40 Meq Kcl 1000 Ml*)  1,000 mls 

@ 75 mls/hr IV PER RATE [facility]


Morphine Sulfate (Morphine Inj (Syringe))*)  1 mg IV Q4H PRN


   PRN Reason: PAIN - SEVERE


Ondansetron HCl (Zofran Inj*)  4 mg IV Q6H PRN


   PRN Reason: NAUSEA


Pantoprazole Sodium (Protonix Iv*)  40 mg IV DAILY [facility]


   Last Admin: 12/31/19 09:38 Dose:  40 mg








 Vital Signs - 8 hr











  12/31/19 12/31/19





  03:27 08:00


 


Temperature 98.2 F 


 


Pulse Rate 110 


 


Respiratory 20 15





Rate  


 


Blood Pressure 141/52 





(mmHg)  


 


O2 Sat by Pulse 96 





Oximetry  











Oxygen Devices in Use Now: None


Appearance: Elderly female, lying in bed, not in distress


Ears/Nose/Mouth/Throat: - - dry oral mucosa, no nystagmus


Respiratory: Symmetrical Chest Expansion and Respiratory Effort, Clear to 

Auscultation


Cardiovascular: RRR, No Edema, - - 3/6 systolic murmur at the RUSB


Extremities: No Edema


Skin: No Rash or Ulcers


Neurological: - - follows some commands, oriented X 2, pleasantly confused.


Result Diagrams: 


 12/31/19 05:49





 12/31/19 05:49





Assess/Plan/Problems-Billing


Assessment: 











- Patient Problems


(1) Accidental aspirin overdose


Current Visit: Yes   Status: Acute   Code(s): T39.011A - POISONING BY ASPIRIN, 

ACCIDENTAL (UNINTENTIONAL), INIT   SNOMED Code(s): 949220227


   Comment: received IV bicarb treatment


improving


now on NS, continue to monitor and re-orient frequently   





(2) Metabolic acidosis


Current Visit: Yes   Status: Acute   Code(s): E87.2 - ACIDOSIS   SNOMED Code(s)

: 19470736


   Comment: improved. will continue to monitor


metabolic acidosis w/ resp alkalosis as seen in ASA ingestion   





(3) Hypokalemia


Current Visit: Yes   Status: Acute   Code(s): E87.6 - HYPOKALEMIA   SNOMED Code(

s): 23677624


   Comment: repletion ordered   





(4) Metastasis


Current Visit: Yes   Status: Acute   Code(s): C79.9 - SECONDARY MALIGNANT 

NEOPLASM OF UNSPECIFIED SITE   SNOMED Code(s): 590774783


   Comment: CT revealed multiple mets including lungs, kidneys, liver, adrenal, 

Retroperitoneal, mediastinal.


Family meeting held today- family is interested in hospice care- at this point 

family to reconvene together and make a decision


will consult palliative care   


Status and Disposition: 





spent 30 minutes face to face with family and evaluating patient, and 

discussing the status.

## 2019-12-31 NOTE — PN
Hospitalist Progress Note


Date of Service: 12/31/19





Called to see patient


Loly is a 85 y/o female with history of breast cancer, admitted for aspirin 

overdose. I was called to see her overnight as she woke up and questioned why 

she was here. 





She was still drowsy and doze off at the beginning part of the conversation. 

She expressed "I want to die", when asked why she thought that way, she said 

"everyone is happy except me". She denied plan for suicide, and could not 

answer whether she took aspirin pills purposely to end her life. But she denied 

any pain at this moment. 


The conversation ended as she was drowsy. 





Plan:


- reassess suicidal ideation tomorrow when she is more alert

## 2020-01-01 LAB
ANION GAP SERPL CALC-SCNC: 8 MMOL/L (ref 2–11)
BASOPHILS # BLD AUTO: 0 10^3/UL (ref 0–0.2)
BUN SERPL-MCNC: 40 MG/DL (ref 6–24)
BUN/CREAT SERPL: 47.6 (ref 8–20)
CALCIUM SERPL-MCNC: 7.3 MG/DL (ref 8.6–10.3)
CHLORIDE SERPL-SCNC: 111 MMOL/L (ref 101–111)
EOSINOPHIL # BLD AUTO: 0 10^3/UL (ref 0–0.6)
GLUCOSE SERPL-MCNC: 89 MG/DL (ref 70–100)
HCO3 SERPL-SCNC: 26 MMOL/L (ref 22–32)
HCT VFR BLD AUTO: 27 % (ref 35–47)
HGB BLD-MCNC: 9.1 G/DL (ref 12–16)
LYMPHOCYTES # BLD AUTO: 0.4 10^3/UL (ref 1–4.8)
MAGNESIUM SERPL-MCNC: 2.2 MG/DL (ref 1.9–2.7)
MCH RBC QN AUTO: 28 PG (ref 27–31)
MCHC RBC AUTO-ENTMCNC: 34 G/DL (ref 31–36)
MCV RBC AUTO: 84 FL (ref 80–97)
MONOCYTES # BLD AUTO: 0.8 10^3/UL (ref 0–0.8)
NEUTROPHILS # BLD AUTO: 8.3 10^3/UL (ref 1.5–7.7)
NRBC # BLD AUTO: 0 10^3/UL
NRBC BLD QL AUTO: 0.1
PLATELET # BLD AUTO: 234 10^3/UL (ref 150–450)
POTASSIUM SERPL-SCNC: 3.8 MMOL/L (ref 3.5–5)
RBC # BLD AUTO: 3.19 10^6 /UL (ref 3.7–4.87)
SODIUM SERPL-SCNC: 145 MMOL/L (ref 135–145)
WBC # BLD AUTO: 9.6 10^3/UL (ref 3.5–10.8)

## 2020-01-01 RX ADMIN — PANTOPRAZOLE SODIUM SCH MG: 40 INJECTION, POWDER, FOR SOLUTION INTRAVENOUS at 08:34

## 2020-01-01 RX ADMIN — DOCUSATE SODIUM SCH MG: 100 CAPSULE, LIQUID FILLED ORAL at 20:10

## 2020-01-01 RX ADMIN — DOCUSATE SODIUM SCH MG: 100 CAPSULE, LIQUID FILLED ORAL at 09:46

## 2020-01-01 RX ADMIN — SODIUM CHLORIDE AND POTASSIUM CHLORIDE SCH MLS/HR: 9; 2.98 INJECTION, SOLUTION INTRAVENOUS at 01:28

## 2020-01-01 NOTE — PN
Subjective


Date of Service: 01/01/20


Interval History: 


This morning patient is less confused, more interactive today.


Discussed with her-regarding cancer, "I do not want anything done."





She is also interested in comfort measures, and we discussed hospicare 

residence.


Family History: Unchanged from Admission


Social History: Unchanged from Admission


Past Medical History: Unchanged from Admission





Objective


Active Medications: 








Potassium Phosphate 10 mmole/ (Sodium Chloride)  253.3333 mls @ 42 mls/hr IVPB 

ONCE ONE


   Stop: 01/01/20 14:01


Morphine Sulfate (Morphine Inj (Syringe))*)  1 mg IV Q4H PRN


   PRN Reason: PAIN - SEVERE


Ondansetron HCl (Zofran Inj*)  4 mg IV Q6H PRN


   PRN Reason: NAUSEA


Pantoprazole Sodium (Protonix Iv*)  40 mg IV DAILY [facility]


   Last Admin: 12/31/19 09:38 Dose:  40 mg








 Vital Signs - 8 hr











  01/01/20 01/01/20





  03:11 07:07


 


Temperature 97.6 F 98.2 F


 


Pulse Rate 87 75


 


Respiratory 12 18





Rate  


 


Blood Pressure 116/40 122/54





(mmHg)  


 


O2 Sat by Pulse 94 94





Oximetry  











Oxygen Devices in Use Now: None


Appearance: Elderly female, in bed, not in distress


Eyes: PERRLA


Respiratory: Symmetrical Chest Expansion and Respiratory Effort, Clear to 

Auscultation


Cardiovascular: NL Sounds; No Murmurs; No JVD, RRR, No Edema


Abdominal: NL Sounds; No Tenderness; No Distention, No Hepatosplenomegaly


Extremities: No Edema


Skin: No Rash or Ulcers


Neurological: NL Muscle Strength and Tone, - - follows commands, answers 

questions, oriented X 1, with focal weakness


Result Diagrams: 


 01/01/20 06:31





 01/01/20 06:31


Microbiology and Other Data: 


 Microbiology











 12/30/19 15:30 Aerobic Blood Culture - Preliminary





 Blood Venous    No Growth Day 1





 Anaerobic Blood Culture - Preliminary





    No Growth Day 1


 


 12/30/19 15:30 Aerobic Blood Culture - Preliminary





 Blood Venous    No Growth Day 1





 Anaerobic Blood Culture - Preliminary





    No Growth Day 1


 


 12/30/19 13:10 Urine Culture - Final





 Urine    No Growth (<1,000 CFU/mL)














Assess/Plan/Problems-Billing


Assessment: 











- Patient Problems


(1) Accidental aspirin overdose


Current Visit: Yes   Status: Acute   Code(s): T39.011A - POISONING BY ASPIRIN, 

ACCIDENTAL (UNINTENTIONAL), INIT   SNOMED Code(s): 194192888


   Comment: received IV bicarb treatment


improved


   





(2) Metabolic acidosis


Current Visit: Yes   Status: Acute   Code(s): E87.2 - ACIDOSIS   SNOMED Code(s)

: 49381132


   Comment: improved. will continue to monitor


metabolic acidosis w/ resp alkalosis as seen in ASA ingestion   





(3) Hypokalemia


Current Visit: Yes   Status: Acute   Code(s): E87.6 - HYPOKALEMIA   SNOMED Code(

s): 20808368


   Comment: Repleted 12/31   





(4) Metastasis


Current Visit: Yes   Status: Acute   Code(s): C79.9 - SECONDARY MALIGNANT 

NEOPLASM OF UNSPECIFIED SITE   SNOMED Code(s): 924987301


   Comment: CT revealed multiple mets including lungs, kidneys, liver, adrenal, 

Retroperitoneal, mediastinal.


family meeting 12/31, seen by palliative care, now working with  

for hospice care placement.   





(5) Hypophosphatemia


Current Visit: Yes   Status: Acute   Code(s): E83.39 - OTHER DISORDERS OF 

PHOSPHORUS METABOLISM   SNOMED Code(s): 0913093


   Comment: Repletion ordered 1/1

## 2020-01-02 RX ADMIN — PANTOPRAZOLE SODIUM SCH MG: 40 INJECTION, POWDER, FOR SOLUTION INTRAVENOUS at 09:05

## 2020-01-02 RX ADMIN — DOCUSATE SODIUM SCH MG: 100 CAPSULE, LIQUID FILLED ORAL at 09:05

## 2020-01-02 RX ADMIN — DOCUSATE SODIUM SCH MG: 100 CAPSULE, LIQUID FILLED ORAL at 19:28

## 2020-01-02 NOTE — PN
Subjective


Date of Service: 01/02/20


Interval History: 





No acute issues overnight


Patient is less confused today, discussed with patient regarding her cancer 

diagnosis- and she would like to pursue hospice care and comfort measures.





Currently she does not have pain, no trouble with breathing.


Family History: Unchanged from Admission


Social History: Unchanged from Admission


Past Medical History: Unchanged from Admission





Objective


Active Medications: 








Docusate Sodium (Colace Cap*)  100 mg PO BID [facility]


   Last Admin: 01/02/20 09:05 Dose:  100 mg


Morphine Sulfate (Morphine Inj (Syringe))*)  1 mg IV Q4H PRN


   PRN Reason: PAIN - SEVERE


Ondansetron HCl (Zofran Inj*)  4 mg IV Q6H PRN


   PRN Reason: NAUSEA


Pantoprazole Sodium (Protonix Iv*)  40 mg IV DAILY [facility]


   Last Admin: 01/02/20 09:05 Dose:  40 mg


Polyethylene Glycol/Electrolytes (Miralax*)  17 gm PO DAILY PRN


   PRN Reason: CONSTIPATION


Senna (Senokot 8.6 Mg Tab*)  1 tab PO BEDTIME PRN


   PRN Reason: CONSTIPATION








 Vital Signs - 8 hr











  01/02/20 01/02/20





  07:54 08:00


 


Temperature 97.9 F 


 


Pulse Rate 72 


 


Respiratory 20 16





Rate  


 


Blood Pressure 134/61 





(mmHg)  


 


O2 Sat by Pulse 96 





Oximetry  











Oxygen Devices in Use Now: None


Appearance: elderly female sitting on chair, not in distress


Eyes: PERRLA


Ears/Nose/Mouth/Throat: Mucous Membranes Moist


Respiratory: Symmetrical Chest Expansion and Respiratory Effort, Clear to 

Auscultation


Cardiovascular: NL Sounds; No Murmurs; No JVD, RRR, No Edema


Abdominal: NL Sounds; No Tenderness; No Distention, No Hepatosplenomegaly


Lymphatic: No Cervical Adenopathy, No Axillary Adenopathy


Skin: No Rash or Ulcers


Neurological: Alert and Oriented x 3


Result Diagrams: 


 01/01/20 06:31





 01/01/20 06:31


Microbiology and Other Data: 


 


 Microbiology











 12/30/19 15:30 Aerobic Blood Culture - Preliminary





 Blood Venous    No Growth Day 2





 Anaerobic Blood Culture - Preliminary





    No Growth Day 2


 


 12/30/19 15:30 Aerobic Blood Culture - Preliminary





 Blood Venous    No Growth Day 2





 Anaerobic Blood Culture - Preliminary





    No Growth Day 2


 


 12/30/19 13:10 Urine Culture - Final





 Urine    No Growth (<1,000 CFU/mL)














Assess/Plan/Problems-Billing


Assessment: 











- Patient Problems


(1) Accidental aspirin overdose


Current Visit: Yes   Status: Acute   Code(s): T39.011A - POISONING BY ASPIRIN, 

ACCIDENTAL (UNINTENTIONAL), INIT   SNOMED Code(s): 418551066


   Comment: received IV bicarb treatment


improved


   





(2) Metabolic acidosis


Current Visit: Yes   Status: Acute   Code(s): E87.2 - ACIDOSIS   SNOMED Code(s)

: 17856956


   Comment: improved. will continue to monitor


metabolic acidosis w/ resp alkalosis as seen in ASA ingestion   





(3) Hypokalemia


Current Visit: Yes   Status: Acute   Code(s): E87.6 - HYPOKALEMIA   SNOMED Code(

s): 43220156


   Comment: Repleted 12/31   





(4) Metastasis


Current Visit: Yes   Status: Acute   Code(s): C79.9 - SECONDARY MALIGNANT 

NEOPLASM OF UNSPECIFIED SITE   SNOMED Code(s): 173205567


   Comment: CT revealed multiple mets including lungs, kidneys, liver, adrenal, 

Retroperitoneal, mediastinal.


family meeting 12/31, seen by palliative care, now working with  

for hospice care placement.   





(5) Hypophosphatemia


Current Visit: Yes   Status: Acute   Code(s): E83.39 - OTHER DISORDERS OF 

PHOSPHORUS METABOLISM   SNOMED Code(s): 7246284


   Comment: Repletion ordered 1/1   


Status and Disposition: 





awaiting hospicare placement.

## 2020-01-03 RX ADMIN — DOCUSATE SODIUM SCH MG: 100 CAPSULE, LIQUID FILLED ORAL at 07:57

## 2020-01-03 RX ADMIN — DOCUSATE SODIUM SCH MG: 100 CAPSULE, LIQUID FILLED ORAL at 20:11

## 2020-01-03 RX ADMIN — PANTOPRAZOLE SODIUM SCH MG: 40 INJECTION, POWDER, FOR SOLUTION INTRAVENOUS at 07:57

## 2020-01-03 NOTE — PN
Subjective


Date of Service: 01/03/20


Interval History: 





No acute issues overnight


Awaiting a response from \[facility]\"" residence


Patient reports no issues this morning, occasional back pain.


Family History: Unchanged from Admission


Social History: Unchanged from Admission


Past Medical History: Unchanged from Admission





Objective


Active Medications: 








Docusate Sodium (Colace Cap*)  100 mg PO BID [facility]


   Last Admin: 01/03/20 07:57 Dose:  100 mg


Morphine Sulfate (Morphine Inj (Syringe))*)  1 mg IV Q4H PRN


   PRN Reason: PAIN - SEVERE


   Last Admin: 01/03/20 07:56 Dose:  1 mg


Ondansetron HCl (Zofran Inj*)  4 mg IV Q6H PRN


   PRN Reason: NAUSEA


Pantoprazole Sodium (Protonix Iv*)  40 mg IV DAILY [facility]


   Last Admin: 01/03/20 07:57 Dose:  40 mg


Polyethylene Glycol/Electrolytes (Miralax*)  17 gm PO DAILY PRN


   PRN Reason: CONSTIPATION


Senna (Senokot 8.6 Mg Tab*)  1 tab PO BEDTIME PRN


   PRN Reason: CONSTIPATION








 Vital Signs - 8 hr











  01/03/20 01/03/20 01/03/20





  03:00 07:00 07:56


 


Temperature 98.0 F 97.7 F 


 


Pulse Rate 83 78 


 


Respiratory 20 18 18





Rate   


 


Blood Pressure 144/53 144/79 





(mmHg)   


 


O2 Sat by Pulse 94 97 





Oximetry   














  01/03/20





  08:00


 


Temperature 


 


Pulse Rate 


 


Respiratory 18





Rate 


 


Blood Pressure 





(mmHg) 


 


O2 Sat by Pulse 





Oximetry 











Oxygen Devices in Use Now: None


Appearance: She is sitting on a chair, not in distress


Eyes: PERRLA


Ears/Nose/Mouth/Throat: Mucous Membranes Moist


Respiratory: Symmetrical Chest Expansion and Respiratory Effort, Clear to 

Auscultation


Cardiovascular: NL Sounds; No Murmurs; No JVD, RRR, No Edema


Abdominal: NL Sounds; No Tenderness; No Distention, No Hepatosplenomegaly


Skin: No Rash or Ulcers


Neurological: Alert and Oriented x 3


Result Diagrams: 


 01/01/20 06:31





 01/01/20 06:31


Microbiology and Other Data: 


 


 Microbiology











 12/30/19 15:30 Aerobic Blood Culture - Preliminary





 Blood Venous    No Growth Day 2





 Anaerobic Blood Culture - Preliminary





    No Growth Day 2


 


 12/30/19 15:30 Aerobic Blood Culture - Preliminary





 Blood Venous    No Growth Day 2





 Anaerobic Blood Culture - Preliminary





    No Growth Day 2


 


 12/30/19 13:10 Urine Culture - Final





 Urine    No Growth (<1,000 CFU/mL)














Assess/Plan/Problems-Billing


Assessment: 











- Patient Problems


(1) Accidental aspirin overdose


Current Visit: Yes   Status: Acute   Code(s): T39.011A - POISONING BY ASPIRIN, 

ACCIDENTAL (UNINTENTIONAL), INIT   SNOMED Code(s): 308355390


   Comment: received IV bicarb treatment


improved


   





(2) Metabolic acidosis


Current Visit: Yes   Status: Acute   Code(s): E87.2 - ACIDOSIS   SNOMED Code(s)

: 65263602


   Comment: improved. 


metabolic acidosis w/ resp alkalosis as seen in ASA ingestion   





(3) Hypokalemia


Current Visit: Yes   Status: Acute   Code(s): E87.6 - HYPOKALEMIA   SNOMED Code(

s): 17098843


   Comment: Repleted 12/31   





(4) Metastasis


Current Visit: Yes   Status: Acute   Code(s): C79.9 - SECONDARY MALIGNANT 

NEOPLASM OF UNSPECIFIED SITE   SNOMED Code(s): 431932620


   Comment: CT revealed multiple mets including lungs, kidneys, liver, adrenal, 

Retroperitoneal, mediastinal.


family meeting 12/31, seen by palliative care, now working with  

for hospice care placement.   





(5) Hypophosphatemia


Current Visit: Yes   Status: Acute   Code(s): E83.39 - OTHER DISORDERS OF 

PHOSPHORUS METABOLISM   SNOMED Code(s): 6709326


   Comment: Repletion ordered 1/1   


Status and Disposition: 





[facility], may have to 

consider SNF placement with hospice sign on.

## 2020-01-04 VITALS — DIASTOLIC BLOOD PRESSURE: 67 MMHG | SYSTOLIC BLOOD PRESSURE: 145 MMHG

## 2020-01-04 RX ADMIN — PANTOPRAZOLE SODIUM SCH MG: 40 INJECTION, POWDER, FOR SOLUTION INTRAVENOUS at 07:48

## 2020-01-04 RX ADMIN — DOCUSATE SODIUM SCH MG: 100 CAPSULE, LIQUID FILLED ORAL at 07:48

## 2020-01-04 NOTE — DS
CC:  Dr. Cecilia Lopez

 

DISCHARGE SUMMARY:

 

DATE OF ADMISSION:  12/30/19

 

DATE OF DISCHARGE:  01/04/20

 

REASON FOR THE ADMISSION:  Back pain.

 

PRIMARY CARE PHYSICIAN:  Dr. Cecilia Lopez.

 

HOSPITAL COURSE:  This is an 86-year-old female with past medical history of 
breast cancer, in remission, who had presented to the emergency room because of 
back pain. The patient had been taking aspirin on and off for her back pain.  
In the emergency room, the patient was found to have salicylate level of 68.20 
and was found to have high anion gap metabolic acidosis with respiratory 
alkalosis.  The patient was given IV bicarbonate supplementation as well as IV 
bicarbonate drip.  She was started on IV fluids and admitted to the hospital 
floor.  The patient was also noted to have multiple metastatic lesions on her 
CAT scan.  In lieu of multiple metastatic diseases, a family meeting was held 
and the patient was made do not resuscitate, do not intubate as well as comfort 
care.  The patient was also seen by palliative care physician, Dr. Aleida Castellanos, and the patient was referred to [facility] for hospice 
treatment.  The patient was accepted to the [facility] and was 
subsequently transferred to the [facility] on 01/04/20.

 

CONSULTATIONS DURING THE COURSE:  Included Dr. Aleida Castellanos, Palliative Care.

 

IMAGES OBTAINED DURING THE HOSPITAL COURSE:  Included chest, abdomen and pelvis 
CT: On 12/30/19, which revealed multiple pulmonary nodules most consistent with 
metastatic disease, enlarged mediastinal nodes, multiple hepatic masses 
consistent with metastatic disease, left adrenal mass also consistent with 
metastatic disease, left renal solid mass likely metastatic, multiple abdominal 
and pelvic masses noted, multiple subcutaneous nodules in the chest and 
abdominal walls. Cholelithiasis and a large hiatal hernia.

 

The patient also underwent a brain CT:  On 12/30/19, which revealed no acute 
intracranial pathology, there is no space occupying lesion or vasogenic edema 
to suggest metastatic disease to the brain.

 

Ribs with chest x-ray:  Done on 12/30/19 revealed osteopenia and no displaced 
rib fracture or pneumothorax.

 

Lumbar CT:  Done on 12/30/19 which revealed osteopenia, degenerative disk 
disease, and osteoarthritis.  No acute osseous injury to the lumbar spine.

 

PHYSICAL EXAMINATION:  Blood pressure of 145/67, heart rate of 82, respiratory 
rate 20, O2 sat 96% on room air, temperature of 98.1 Fahrenheit.  General:  Well
- developed, elderly female, sitting on a chair, in no acute distress.  Pupils 
are equal, round, reactive to light.  Atraumatic, normocephalic.  There is no 
JVD. Heart:  There is no chest wall tenderness.  Soft systolic murmur at the 
apex. Regular rhythm.  S1, S2 normal.  Lungs:  No tachypnea.  No use of 
accessory muscles.  Lungs are clear.  Abdomen:  Normoactive bowel sounds.  
Abdomen is soft, nontender, nondistended.  Neurological:  She is awake, alert, 
and oriented x3 with no focal neurological deficits.

 

DISCHARGE PLANNING:

 

DISCHARGE DIAGNOSES:

1.  Aspirin overdose.

2.  Metastatic cancer.

3.  History of gastroesophageal reflux disease.



ADDITIONAL DIAGNOSES:  Include:

1.  Hypokalemia, repleted.

2.  Hypophosphatemia, repleted.

3.  Encephalopathy, toxic encephalopathy secondary to aspirin overdose, 
resolved.

4.  Metabolic acidosis, resolved.

 

DISCHARGE CONDITION:  Stable.

 

DISCHARGE DISPOSITION:  [facility]

 

DIET:  Regular.

 

DISCHARGE MEDICATIONS:  Include:

1.  Acetaminophen 325 mg every 6 hours as needed for mild pain.

2.  Colace 100 mg twice a day.

3.  Omeprazole 40 mg daily.

4.  Oxycodone 5 mg every 8 hours as needed for moderate to severe pain.

 

The patient is to be discharged today, 01/04/20.  Of note throughout the 
hospital course, I met several times with the patient's nephew as well as the 
patient's sister who is the healthcare proxy.  I also talked via conference via 
the cellphone to the other healthcare proxy who is the brother, Watson.

 

CODE STATUS:  Do not resuscitate, do not intubate.  MOLST form has been filled 
out during the hospital course.

 



 

 [id number]/[facility] #: [id number]

MTDD